# Patient Record
Sex: FEMALE | Race: WHITE
[De-identification: names, ages, dates, MRNs, and addresses within clinical notes are randomized per-mention and may not be internally consistent; named-entity substitution may affect disease eponyms.]

---

## 2021-06-08 ENCOUNTER — HOSPITAL ENCOUNTER (EMERGENCY)
Dept: HOSPITAL 79 - ER1 | Age: 61
Discharge: HOME | End: 2021-06-08
Payer: COMMERCIAL

## 2021-06-08 DIAGNOSIS — E78.5: ICD-10-CM

## 2021-06-08 DIAGNOSIS — E11.9: ICD-10-CM

## 2021-06-08 DIAGNOSIS — Z95.2: ICD-10-CM

## 2021-06-08 DIAGNOSIS — I11.9: ICD-10-CM

## 2021-06-08 DIAGNOSIS — R07.89: Primary | ICD-10-CM

## 2021-06-08 DIAGNOSIS — Z87.891: ICD-10-CM

## 2021-06-08 DIAGNOSIS — Z88.8: ICD-10-CM

## 2021-06-08 DIAGNOSIS — R42: ICD-10-CM

## 2021-06-08 DIAGNOSIS — Z90.49: ICD-10-CM

## 2021-06-08 DIAGNOSIS — R06.02: ICD-10-CM

## 2021-06-08 LAB
BUN/CREATININE RATIO: 19 (ref 0–10)
HGB BLD-MCNC: 12.4 GM/DL (ref 12.3–15.3)
RED BLOOD COUNT: 4.45 M/UL (ref 4–5.1)
WHITE BLOOD COUNT: 7 K/UL (ref 4.5–11)

## 2021-07-18 ENCOUNTER — HOSPITAL ENCOUNTER (EMERGENCY)
Dept: HOSPITAL 79 - ER1 | Age: 61
Discharge: HOME | End: 2021-07-18
Payer: COMMERCIAL

## 2021-07-18 DIAGNOSIS — Z90.49: ICD-10-CM

## 2021-07-18 DIAGNOSIS — Z95.5: ICD-10-CM

## 2021-07-18 DIAGNOSIS — R22.42: Primary | ICD-10-CM

## 2021-07-18 DIAGNOSIS — E78.5: ICD-10-CM

## 2021-07-18 DIAGNOSIS — I11.9: ICD-10-CM

## 2021-07-18 DIAGNOSIS — Z79.4: ICD-10-CM

## 2021-07-18 DIAGNOSIS — J44.9: ICD-10-CM

## 2021-07-18 DIAGNOSIS — E11.9: ICD-10-CM

## 2021-07-18 DIAGNOSIS — Z88.8: ICD-10-CM

## 2021-07-18 LAB
BUN/CREATININE RATIO: 18 (ref 0–10)
HGB BLD-MCNC: 12.1 GM/DL (ref 12.3–15.3)
RED BLOOD COUNT: 4.3 M/UL (ref 4–5.1)
WHITE BLOOD COUNT: 6.3 K/UL (ref 4.5–11)

## 2021-08-06 ENCOUNTER — OFFICE VISIT (OUTPATIENT)
Dept: ENDOCRINOLOGY | Facility: CLINIC | Age: 61
End: 2021-08-06

## 2021-08-06 VITALS
HEIGHT: 63 IN | SYSTOLIC BLOOD PRESSURE: 136 MMHG | HEART RATE: 77 BPM | OXYGEN SATURATION: 99 % | BODY MASS INDEX: 41.46 KG/M2 | DIASTOLIC BLOOD PRESSURE: 58 MMHG | WEIGHT: 234 LBS

## 2021-08-06 DIAGNOSIS — E11.65 UNCONTROLLED TYPE 2 DIABETES MELLITUS WITH HYPERGLYCEMIA (HCC): Primary | ICD-10-CM

## 2021-08-06 LAB
EXPIRATION DATE: ABNORMAL
EXPIRATION DATE: NORMAL
GLUCOSE BLDC GLUCOMTR-MCNC: 204 MG/DL (ref 70–130)
HBA1C MFR BLD: 8.9 %
Lab: ABNORMAL
Lab: NORMAL

## 2021-08-06 PROCEDURE — 82947 ASSAY GLUCOSE BLOOD QUANT: CPT | Performed by: INTERNAL MEDICINE

## 2021-08-06 PROCEDURE — 99204 OFFICE O/P NEW MOD 45 MIN: CPT | Performed by: INTERNAL MEDICINE

## 2021-08-06 PROCEDURE — 83036 HEMOGLOBIN GLYCOSYLATED A1C: CPT | Performed by: INTERNAL MEDICINE

## 2021-08-06 RX ORDER — LISINOPRIL 5 MG/1
5 TABLET ORAL DAILY
COMMUNITY
Start: 2021-07-28 | End: 2022-08-04

## 2021-08-06 RX ORDER — PANTOPRAZOLE SODIUM 40 MG/1
40 TABLET, DELAYED RELEASE ORAL DAILY
COMMUNITY
Start: 2021-07-28

## 2021-08-06 RX ORDER — SITAGLIPTIN 100 MG/1
100 TABLET, FILM COATED ORAL DAILY
COMMUNITY
Start: 2021-07-28 | End: 2021-08-06

## 2021-08-06 RX ORDER — INSULIN LISPRO 100 [IU]/ML
20 INJECTION, SOLUTION INTRAVENOUS; SUBCUTANEOUS 3 TIMES DAILY
COMMUNITY
Start: 2021-07-16 | End: 2022-11-14 | Stop reason: SDUPTHER

## 2021-08-06 RX ORDER — DULAGLUTIDE 0.75 MG/.5ML
0.5 INJECTION, SOLUTION SUBCUTANEOUS WEEKLY
Qty: 6 ML | Refills: 3 | Status: SHIPPED | OUTPATIENT
Start: 2021-08-06 | End: 2022-04-13 | Stop reason: DRUGHIGH

## 2021-08-06 RX ORDER — OMEGA-3/DHA/EPA/FISH OIL 300-1000MG
CAPSULE,DELAYED RELEASE (ENTERIC COATED) ORAL 2 TIMES DAILY
COMMUNITY
Start: 2021-05-28

## 2021-08-06 RX ORDER — FLASH GLUCOSE SCANNING READER
EACH MISCELLANEOUS
COMMUNITY
End: 2022-12-14

## 2021-08-06 RX ORDER — RANOLAZINE 1000 MG/1
1 TABLET, EXTENDED RELEASE ORAL EVERY 12 HOURS
COMMUNITY
Start: 2021-07-25 | End: 2022-08-04

## 2021-08-06 RX ORDER — METOPROLOL TARTRATE 50 MG/1
25 TABLET, FILM COATED ORAL 2 TIMES DAILY
COMMUNITY
Start: 2021-07-28

## 2021-08-06 RX ORDER — ISOSORBIDE MONONITRATE 60 MG/1
60 TABLET, EXTENDED RELEASE ORAL DAILY
COMMUNITY
Start: 2021-07-28 | End: 2022-08-04

## 2021-08-06 RX ORDER — FLASH GLUCOSE SENSOR
KIT MISCELLANEOUS
COMMUNITY
End: 2022-11-14 | Stop reason: SDUPTHER

## 2021-08-06 RX ORDER — ROSUVASTATIN CALCIUM 20 MG/1
20 TABLET, COATED ORAL EVERY EVENING
COMMUNITY
Start: 2021-07-28

## 2021-08-06 RX ORDER — MAGNESIUM 200 MG
1 TABLET ORAL DAILY
COMMUNITY
Start: 2021-07-28

## 2021-08-06 RX ORDER — PRASUGREL 10 MG/1
10 TABLET, FILM COATED ORAL DAILY
COMMUNITY
Start: 2021-07-17

## 2021-08-06 RX ORDER — FLASH GLUCOSE SENSOR
KIT MISCELLANEOUS
COMMUNITY
Start: 2021-07-22 | End: 2021-08-06 | Stop reason: SDUPTHER

## 2021-08-06 RX ORDER — OXYBUTYNIN CHLORIDE 5 MG/1
5 TABLET ORAL 2 TIMES DAILY
COMMUNITY
Start: 2021-07-28 | End: 2022-08-04

## 2021-08-06 RX ORDER — MONTELUKAST SODIUM 10 MG/1
10 TABLET ORAL DAILY
COMMUNITY
Start: 2021-07-28

## 2021-08-06 RX ORDER — INSULIN GLARGINE 100 [IU]/ML
60 INJECTION, SOLUTION SUBCUTANEOUS 2 TIMES DAILY
COMMUNITY
Start: 2021-06-29 | End: 2022-07-29

## 2021-08-06 RX ORDER — PEN NEEDLE, DIABETIC 31 GX5/16"
NEEDLE, DISPOSABLE MISCELLANEOUS SEE ADMIN INSTRUCTIONS
COMMUNITY
Start: 2021-07-15

## 2021-08-06 RX ORDER — ROPINIROLE 0.25 MG/1
0.25 TABLET, FILM COATED ORAL DAILY
COMMUNITY
Start: 2021-07-28

## 2021-08-06 NOTE — PROGRESS NOTES
"     Office Note      Date: 2021  Patient Name: Mariola Beck  MRN: [id number]  : 1960    Chief Complaint   Patient presents with   • Diabetes       History of Present Illness:   Mariola Beck is a 61 y.o. female who presents for Diabetes type 2. Diagnosed in: - presented as gestational. Fixed type 2 in . . Treated in past with insulin. Current patrick tments: added oral agents to insulin never been on glp1.. Number of insulin shots per day: >4. Checks blood sugar >4 times a day. Has low blood sugar: rare.     Last A1c:  Hemoglobin A1C   Date Value Ref Range Status   2021 8.9 % Final           Subjective      Diabetic Complications:  Eyes: Yes, describe: retinopathy   Kidneys: No  Feet: Yes, describe: neuropathy  Heart: Yes, describe: cad and stents     Diet and Exercise:  Meals per day: 4  Minutes of exercise per week: 90 mins.    Review of Systems:   Review of Systems   Constitutional: Positive for activity change.   HENT: Positive for sinus pressure, tinnitus and trouble swallowing.    Eyes: Positive for photophobia, itching and visual disturbance.   Respiratory: Positive for apnea, cough, chest tightness and shortness of breath.    Cardiovascular: Positive for leg swelling.   Endocrine: Positive for polydipsia.   Genitourinary: Positive for enuresis.   Musculoskeletal: Positive for back pain and gait problem.   Neurological: Positive for dizziness, light-headedness, numbness and headaches.   Psychiatric/Behavioral: Positive for confusion. The patient is nervous/anxious.        The following portions of the patient's history were reviewed and updated as appropriate: allergies, current medications, past family history, past medical history, past social history, past surgical history and problem list.    Objective     Visit Vitals  /58   Pulse 77   Ht 160 cm (63\")   Wt 106 kg (234 lb)   SpO2 99%   BMI 41.45 kg/m²       Labs:    CMP  No results found for: GLUCOSE, BUN, CREATININE, " EGFRIFNONA, EGFRIFAFRI, BCR, K, CO2, CALCIUM, PROTENTOTREF, LABIL2, BILIRUBIN, AST, ALT     CBC w/DIFF  No results found for: WBC, RBC, HGB, HCT, MCV, MCH, MCHC, RDW, RDWSD, MPV, PLT, NEUTRORELPCT, LYMPHORELPCT, MONORELPCT, EOSRELPCT, BASORELPCT, AUTOIGPER, NEUTROABS, LYMPHSABS, MONOSABS, EOSABS, BASOSABS, AUTOIGNUM, NRBC    Physical Exam:  Physical Exam  Vitals reviewed.   Constitutional:       Appearance: Normal appearance. She is normal weight.   HENT:      Head: Normocephalic and atraumatic.   Eyes:      Extraocular Movements: Extraocular movements intact.   Neck:      Comments: Irregular right thyroid lobe noted  Cardiovascular:      Rate and Rhythm: Normal rate and regular rhythm.      Pulses:           Dorsalis pedis pulses are 2+ on the right side and 2+ on the left side.        Posterior tibial pulses are 2+ on the right side and 2+ on the left side.   Pulmonary:      Effort: Pulmonary effort is normal. No respiratory distress.   Musculoskeletal:      Right foot: Normal range of motion. No deformity, bunion, Charcot foot, foot drop or prominent metatarsal heads.      Left foot: Normal range of motion. No deformity, bunion, Charcot foot, foot drop or prominent metatarsal heads.   Feet:      Right foot:      Protective Sensation: 5 sites tested. 5 sites sensed.      Skin integrity: Skin integrity normal.      Toenail Condition: Right toenails are normal.      Left foot:      Protective Sensation: 5 sites tested. 5 sites sensed.      Skin integrity: Skin integrity normal.      Toenail Condition: Left toenails are normal.      Comments: Diabetic Foot Exam Performed and Monofilament Test Performed    Lymphadenopathy:      Cervical: No cervical adenopathy.   Skin:     General: Skin is warm and dry.   Neurological:      Mental Status: She is alert.   Psychiatric:         Mood and Affect: Mood normal.         Thought Content: Thought content normal.         Judgment: Judgment normal.          Assessment / Plan       Assessment & Plan:  Problem List Items Addressed This Visit        Other    Uncontrolled type 2 diabetes mellitus with hyperglycemia (CMS/[facility]) - Primary    Overview     Cannot take sglt2 due to frequent uti         Current Assessment & Plan      a1c high . At high risk for new complications    -1- diet and exercise guidelines reviewed  -2- compliance with current meds verified as much as possible.  -3- stop januvia  -4- start glp-1 side effects were discussed. No personal or family with mtc known.          Relevant Medications    Insulin Glargine (BASAGLAR KWIKPEN) 100 UNIT/ML injection pen    HumaLOG KwikPen 100 UNIT/ML solution pen-injector    metFORMIN (GLUCOPHAGE) 1000 MG tablet    Dulaglutide (Trulicity) 0.75 MG/0.5ML solution pen-injector    Other Relevant Orders    POC Glycosylated Hemoglobin (Hb A1C) (Completed)    POC Glucose, Blood (Completed)           Murtaza Perez MD   08/06/2021

## 2021-08-06 NOTE — ASSESSMENT & PLAN NOTE
a1c high . At high risk for new complications    -1- diet and exercise guidelines reviewed  -2- compliance with current meds verified as much as possible.  -3- stop januvia  -4- start glp-1 side effects were discussed. No personal or family with mtc known.

## 2021-08-09 ENCOUNTER — PATIENT ROUNDING (BHMG ONLY) (OUTPATIENT)
Dept: ENDOCRINOLOGY | Facility: CLINIC | Age: 61
End: 2021-08-09

## 2021-08-09 NOTE — PROGRESS NOTES
August 9, 2021    Hello, may I speak with Mariola Beck?    My name is Jocelyne    I am  with [facility]  [address] [address] [phone].    Before we get started may I verify your date of birth? 1960    I am calling to officially welcome you to our practice and ask about your recent visit. Is this a good time to talk? YES      Tell me about your visit with us. What things went well?  THINGS WENT VERY WELL, STILL WAITING FOR A F/U APPT. WITH THE NP IN [place]. ADVISED PT. WE ARE CURRENTLY WORKING ON HER SCHEDULE AND SOMEONE WOULD BE IN TOUCH SOON.       We're always looking for ways to make our patients' experiences even better. Do you have recommendations on ways we may improve?  NO EVERYTHING WAS GOOD    Overall were you satisfied with your first visit to our practice? YES        I appreciate you taking the time to speak with me today. Is there anything else I can do for you? NO      Thank you, and have a great day.

## 2021-12-06 ENCOUNTER — SPECIALTY PHARMACY (OUTPATIENT)
Dept: PHARMACY | Facility: HOSPITAL | Age: 61
End: 2021-12-06

## 2021-12-06 ENCOUNTER — OFFICE VISIT (OUTPATIENT)
Dept: ENDOCRINOLOGY | Facility: CLINIC | Age: 61
End: 2021-12-06

## 2021-12-06 VITALS
WEIGHT: 239 LBS | HEIGHT: 63 IN | TEMPERATURE: 97.8 F | DIASTOLIC BLOOD PRESSURE: 80 MMHG | HEART RATE: 87 BPM | BODY MASS INDEX: 42.35 KG/M2 | SYSTOLIC BLOOD PRESSURE: 126 MMHG | OXYGEN SATURATION: 95 %

## 2021-12-06 DIAGNOSIS — E11.65 UNCONTROLLED TYPE 2 DIABETES MELLITUS WITH HYPERGLYCEMIA (HCC): Primary | ICD-10-CM

## 2021-12-06 LAB
EXPIRATION DATE: ABNORMAL
HBA1C MFR BLD: 7 %
Lab: ABNORMAL

## 2021-12-06 PROCEDURE — 99213 OFFICE O/P EST LOW 20 MIN: CPT | Performed by: NURSE PRACTITIONER

## 2021-12-06 PROCEDURE — 83036 HEMOGLOBIN GLYCOSYLATED A1C: CPT | Performed by: NURSE PRACTITIONER

## 2021-12-06 RX ORDER — ASPIRIN 81 MG/1
81 TABLET ORAL DAILY
COMMUNITY

## 2021-12-06 NOTE — PROGRESS NOTES
Specialty Pharmacy Patient Management Program  Endocrinology Initial Assessment       Mariola Beck is a 61 y.o. female with Type 2 Diabetes seen by an Endocrinology provider and enrolled in the Endocrinology Patient Management program offered by [facility].     Patient is currently using Basaglar, Humalog, metformin, and Trulicity for her DM regimen. Patient reports some nausea with Trulicity, but tolerating well overall. She reports that since being on this regimen, she has seen improvement in her A1C (7.0% today), and her BG.     Relevant Past Medical History and Comorbidities  Relevant medical history and concomitant health conditions were discussed with the patient. The patient's chart has been reviewed for relevant past medical history and comorbid health conditions and updated as necessary.   Past Medical History:   Diagnosis Date   • Asthma    • Chronic headaches    • H/O bladder infections    • Heart attack (HCC)    • Heart disease    • Hypertension    • Type 2 diabetes mellitus (HCC)      Social History     Socioeconomic History   • Marital status:    Tobacco Use   • Smoking status: Former Smoker     Packs/day: 1.00     Types: Cigarettes   • Smokeless tobacco: Never Used   Vaping Use   • Vaping Use: Never used   Substance and Sexual Activity   • Alcohol use: Not Currently   • Drug use: Never   • Sexual activity: Defer       Allergies  Known allergies and reactions were discussed with the patient. The patient's chart has been reviewed for  allergy information and updated as necessary.   Plavix [clopidogrel]    Current Medication List  This medication list has been reviewed with the patient and evaluated for any interactions or necessary modifications/recommendations, and updated to include all prescription medications, OTC medications, and supplements the patient is currently taking.  This list reflects what is contained in the patient's profile, which has also been  marked as reviewed to communicate to other providers it is the most up to date version of the patient's current medication therapy.         Current Outpatient Medications:   •  aspirin 81 MG EC tablet, Take 81 mg by mouth Daily., Disp: , Rfl:   •  B-D ULTRAFINE III SHORT PEN 31G X 8 MM misc, See Admin Instructions., Disp: , Rfl:   •  Continuous Blood Gluc  (FreeStyle Raymond 14 Day [place]) device, , Disp: , Rfl:   •  Continuous Blood Gluc Sensor (FreeStyle Raymond 14 Day Sensor) misc, Change every 14 days, Disp: , Rfl:   •  Cyanocobalamin (B-12) 1000 MCG sublingual tablet, Take 1 tablet by mouth Daily., Disp: , Rfl:   •  Dulaglutide (Trulicity) 0.75 MG/0.5ML solution pen-injector, Inject 0.75 mg under the skin into the appropriate area as directed 1 (One) Time Per Week., Disp: 6 mL, Rfl: 3  •  HumaLOG KwikPen 100 UNIT/ML solution pen-injector, Inject 20 Units under the skin into the appropriate area as directed 3 (Three) Times a Day., Disp: , Rfl:   •  Insulin Glargine (BASAGLAR KWIKPEN) 100 UNIT/ML injection pen, Inject 60 Units under the skin into the appropriate area as directed 2 (two) times a day., Disp: , Rfl:   •  isosorbide mononitrate (IMDUR) 60 MG 24 hr tablet, Take 60 mg by mouth Daily., Disp: , Rfl:   •  lisinopril (PRINIVIL,ZESTRIL) 5 MG tablet, Take 5 mg by mouth Daily., Disp: , Rfl:   •  metFORMIN (GLUCOPHAGE) 1000 MG tablet, Take 1,000 mg by mouth 2 (Two) Times a Day., Disp: , Rfl:   •  metoprolol tartrate (LOPRESSOR) 50 MG tablet, Take 50 mg by mouth 2 (Two) Times a Day., Disp: , Rfl:   •  montelukast (SINGULAIR) 10 MG tablet, Take 10 mg by mouth Daily., Disp: , Rfl:   •  Omega-3 Fatty Acids (Odorless Coated Fish Oil) 1000 MG capsule delayed-release, 2 (two) times a day., Disp: , Rfl:   •  oxybutynin (DITROPAN) 5 MG tablet, Take 5 mg by mouth 2 (Two) Times a Day., Disp: , Rfl:   •  pantoprazole (PROTONIX) 40 MG EC tablet, Take 40 mg by mouth Daily., Disp: , Rfl:   •  prasugrel (EFFIENT) 10 MG  tablet, Take 10 mg by mouth Daily., Disp: , Rfl:   •  ranolazine (RANEXA) 1000 MG 12 hr tablet, Take 1 tablet by mouth Every 12 (Twelve) Hours., Disp: , Rfl:   •  rOPINIRole (REQUIP) 0.25 MG tablet, Take 0.25 mg by mouth Daily., Disp: , Rfl:   •  rosuvastatin (CRESTOR) 20 MG tablet, Take 20 mg by mouth Every Evening., Disp: , Rfl:     Drug Interactions  None    Recommended Medications Assessment  • Aspirin - Currently Taking  • Statin - Currently Taking  • ACEi/ARB - Currently Taking    Current 10-Year ASCVD Risk:     Relevant Laboratory Values  A1C Last 3 Results    HGBA1C Last 3 Results 8/6/21 12/6/21   Hemoglobin A1C 8.9 7.0           Lab Results   Component Value Date    HGBA1C 7.0 12/06/2021     No results found for: GLUCOSE, CALCIUM, NA, K, CO2, CL, BUN, CREATININE, EGFRIFAFRI, EGFRIFNONA, BCR, ANIONGAP  No results found for: CHOL, CHLPL, TRIG, HDL, LDL, LDLDIRECT      Education Provided for DM medications:  The patient has been provided with the following education and any applicable administration techniques (i.e. self-injection) have been demonstrated for the therapies indicated. All questions and concerns have been addressed prior to the patient receiving the medication, and the patient has verbalized understanding of the education and any materials provided.  Additional patient education shall be provided and documented upon request by the patient, provider or payer.      Trulicity  · Discussed the medication's MOA and that it helps you feel full, helps your body release more insulin and helps your body make less sugar after meals.  · Also discussed that nausea, vomiting, constipation and/or diarrhea tend to be the most common adverse effects, especially if larger meals are eaten.  Encouraged smaller meals throughout the day.      Adherence and Self-Administration  • Barriers to Patient Adherence and/or Self-Administration: None   • Methods for Supporting Patient Adherence and/or Self-Administration:  None    Vaccination Status:   COVID 19: UTD  Influenza: UTD  Pneumococcal: UTD   Hep B: unknown      Goals of Therapy    Keep A1C at goal.       Reassessment Plan & Follow-Up  1. No pharmacologic recommendations at this time. Patient is at goal. Could consider increasing Trulicity dose in the future.     Patient does not wish to use [facility] services at this time.

## 2021-12-06 NOTE — ASSESSMENT & PLAN NOTE
-Diabetes is at goal with A1C 7.0.  -Continue Trulicity 0.75 mg weekly.  -Continue Basaglar 60 units BID.  -Continue Humalog 20 units TID with meals.  -Continue Metformin 1,000 mg BID.  -S/S hypoglycemia discussed with Rule of 15's advised.  -Continue checking BG regularly.  -Will reassess in 4 months.

## 2021-12-06 NOTE — PROGRESS NOTES
"Chief Complaint   Patient presents with   • Diabetes     follow up           HPI   Mariola Beck is a 61 y.o. female had concerns including Diabetes (follow up ).    T2DM.    She is checking blood sugars frequently with Freestyle Raymond.  Current medications for diabetes include Trulicity 0.75 mg weekly, Basaglar 60 units BID, Humalog 20 units TID, Metformin 1,000 mg BID.  She is on statin therapy.  Hypos: rarely  She has had some vomiting/nausea but it has not increased since starting Trulicity.    The following portions of the patient's history were reviewed and updated as appropriate: allergies, current medications, past family history, past medical history, past social history, past surgical history and problem list.      Review of Systems   Constitutional: Positive for fatigue.   Endocrine: Positive for polydipsia and polyuria.   All other systems reviewed and are negative.       Physical Exam  Vitals reviewed.   Constitutional:       Appearance: Normal appearance.   Pulmonary:      Effort: Pulmonary effort is normal.   Neurological:      Mental Status: She is alert and oriented to person, place, and time.   Psychiatric:         Mood and Affect: Mood normal.         Behavior: Behavior normal.         Thought Content: Thought content normal.         Judgment: Judgment normal.        /80 (BP Location: Left arm, Patient Position: Sitting, Cuff Size: Adult)   Pulse 87   Temp 97.8 °F (36.6 °C) (Oral)   Ht 160 cm (63\")   Wt 108 kg (239 lb)   SpO2 95%   Breastfeeding No   BMI 42.34 kg/m²      Labs and imaging    CMP:     Lipid Panel:  No results found for: CHOL, TRIG, HDL, VLDL, LDL  HbA1c:  Lab Results   Component Value Date    HGBA1C 7.0 12/06/2021    HGBA1C 8.9 08/06/2021     Glucose:    Lab Results   Component Value Date    POCGLU 204 (A) 08/06/2021     Microalbumin:  No results found for: MALBCRERATIO  TSH:  No results found for: TSH    Assessment and plan  Diagnoses and all orders for this " visit:    1. Uncontrolled type 2 diabetes mellitus with hyperglycemia (HCC) (Primary)  Assessment & Plan:  -Diabetes is at goal with A1C 7.0.  -Continue Trulicity 0.75 mg weekly.  -Continue Basaglar 60 units BID.  -Continue Humalog 20 units TID with meals.  -Continue Metformin 1,000 mg BID.  -S/S hypoglycemia discussed with Rule of 15's advised.  -Continue checking BG regularly.  -Will reassess in 4 months.      Orders:  -     POC Glycosylated Hemoglobin (Hb A1C)       Return in about 4 months (around 4/6/2022) for Follow-up appointment, A1C. The patient was instructed to contact the clinic with any interval questions or concerns.    KHURRAM Maldonado

## 2022-01-22 ENCOUNTER — HOSPITAL ENCOUNTER (EMERGENCY)
Dept: HOSPITAL 79 - ER1 | Age: 62
Discharge: HOME | End: 2022-01-22
Payer: COMMERCIAL

## 2022-01-22 DIAGNOSIS — Z20.822: ICD-10-CM

## 2022-01-22 DIAGNOSIS — E78.5: ICD-10-CM

## 2022-01-22 DIAGNOSIS — Z88.8: ICD-10-CM

## 2022-01-22 DIAGNOSIS — I11.9: ICD-10-CM

## 2022-01-22 DIAGNOSIS — J06.9: Primary | ICD-10-CM

## 2022-01-22 DIAGNOSIS — E11.9: ICD-10-CM

## 2022-01-22 LAB
BUN/CREATININE RATIO: 20 (ref 0–10)
HGB BLD-MCNC: 12.9 GM/DL (ref 12.3–15.3)
RED BLOOD COUNT: 4.63 M/UL (ref 4–5.1)
WHITE BLOOD COUNT: 10.2 K/UL (ref 4.5–11)

## 2022-01-22 PROCEDURE — U0002 COVID-19 LAB TEST NON-CDC: HCPCS

## 2022-02-13 ENCOUNTER — HOSPITAL ENCOUNTER (EMERGENCY)
Dept: HOSPITAL 79 - ER1 | Age: 62
Discharge: HOME | End: 2022-02-13
Payer: COMMERCIAL

## 2022-02-13 DIAGNOSIS — I25.2: ICD-10-CM

## 2022-02-13 DIAGNOSIS — Z20.822: ICD-10-CM

## 2022-02-13 DIAGNOSIS — F17.210: ICD-10-CM

## 2022-02-13 DIAGNOSIS — N39.0: ICD-10-CM

## 2022-02-13 DIAGNOSIS — G43.909: Primary | ICD-10-CM

## 2022-02-13 DIAGNOSIS — Z88.8: ICD-10-CM

## 2022-02-13 DIAGNOSIS — R11.2: ICD-10-CM

## 2022-02-13 LAB
BUN/CREATININE RATIO: 15 (ref 0–10)
HGB BLD-MCNC: 13.6 GM/DL (ref 12.3–15.3)
RED BLOOD COUNT: 4.77 M/UL (ref 4–5.1)
WHITE BLOOD COUNT: 12.7 K/UL (ref 4.5–11)

## 2022-04-13 ENCOUNTER — OFFICE VISIT (OUTPATIENT)
Dept: ENDOCRINOLOGY | Facility: CLINIC | Age: 62
End: 2022-04-13

## 2022-04-13 VITALS
HEART RATE: 77 BPM | HEIGHT: 63 IN | OXYGEN SATURATION: 96 % | SYSTOLIC BLOOD PRESSURE: 90 MMHG | DIASTOLIC BLOOD PRESSURE: 50 MMHG | BODY MASS INDEX: 40.4 KG/M2 | TEMPERATURE: 96 F | WEIGHT: 228 LBS

## 2022-04-13 DIAGNOSIS — I10 ESSENTIAL HYPERTENSION: ICD-10-CM

## 2022-04-13 DIAGNOSIS — E11.65 UNCONTROLLED TYPE 2 DIABETES MELLITUS WITH HYPERGLYCEMIA: Primary | ICD-10-CM

## 2022-04-13 LAB — GLUCOSE BLDC GLUCOMTR-MCNC: 101 MG/DL (ref 70–130)

## 2022-04-13 PROCEDURE — 82962 GLUCOSE BLOOD TEST: CPT | Performed by: NURSE PRACTITIONER

## 2022-04-13 PROCEDURE — 99213 OFFICE O/P EST LOW 20 MIN: CPT | Performed by: NURSE PRACTITIONER

## 2022-04-13 RX ORDER — DULAGLUTIDE 1.5 MG/.5ML
1.5 INJECTION, SOLUTION SUBCUTANEOUS WEEKLY
Qty: 6 ML | Refills: 1 | Status: SHIPPED | OUTPATIENT
Start: 2022-04-13 | End: 2022-08-04 | Stop reason: DRUGHIGH

## 2022-04-13 NOTE — ASSESSMENT & PLAN NOTE
-Diabetes is above goal with A1C up from 7.0 to 7.7.  -Increase Trulicity 1.5 mg weekly.  -Continue Basaglar 60 units BID.  -Continue Humalog 20 units TID with meals.  -Continue Metformin 1,000 mg BID.  -S/S hypoglycemia discussed with Rule of 15's advised.  -Continue checking BG regularly.  -Will reassess in 3 months.

## 2022-04-13 NOTE — PROGRESS NOTES
"Chief Complaint   Patient presents with   • Diabetes     Follow up           Mariola Beck is a 61 y.o. female had concerns including Diabetes (Follow up).    T2DM.    She is checking blood sugars frequently with Freestyle Raymond.  Current medications for diabetes include Trulicity 0.75 mg weekly, Basaglar 60 units BID, Humalog 20 units TID, Metformin 1,000 mg BID.  She is on statin therapy.  Hypos: rarely  She has had bronchitis and flu since she was here.  She was in the hospital. She had steroid shots while she in the hospital and prior to being in the hospital.    She had an A1C 3-4 weeks ago and it was 7.7.    The following portions of the patient's history were reviewed and updated as appropriate: allergies, current medications, past family history, past medical history, past social history, past surgical history and problem list.      Review of Systems   Constitutional: Positive for fatigue.   Respiratory: Positive for shortness of breath.    Endocrine: Positive for polydipsia. Negative for polyuria.   All other systems reviewed and are negative.       Physical Exam  Vitals reviewed.   Constitutional:       Appearance: Normal appearance.   Pulmonary:      Effort: Pulmonary effort is normal.   Neurological:      Mental Status: She is alert and oriented to person, place, and time.   Psychiatric:         Mood and Affect: Mood normal.         Behavior: Behavior normal.         Thought Content: Thought content normal.         Judgment: Judgment normal.        BP 90/50 (BP Location: Left arm, Patient Position: Sitting, Cuff Size: Adult)   Pulse 77   Temp 96 °F (35.6 °C) (Infrared)   Ht 160 cm (63\")   Wt 103 kg (228 lb)   LMP  (LMP Unknown)   SpO2 96%   Breastfeeding No   BMI 40.39 kg/m²      Labs and imaging    CMP:     Lipid Panel:  No results found for: CHOL, TRIG, HDL, VLDL, LDL  HbA1c:  Lab Results   Component Value Date    HGBA1C 7.0 12/06/2021    HGBA1C 8.9 08/06/2021     Glucose:    Lab Results "   Component Value Date    POCGLU 101 04/13/2022     Microalbumin:  No results found for: MALBCREOLLIEPARISHO  TSH:  No results found for: TSH    Assessment and plan  Diagnoses and all orders for this visit:    1. Uncontrolled type 2 diabetes mellitus with hyperglycemia (HCC) (Primary)  Assessment & Plan:  -Diabetes is above goal with A1C up from 7.0 to 7.7.  -Increase Trulicity 1.5 mg weekly.  -Continue Basaglar 60 units BID.  -Continue Humalog 20 units TID with meals.  -Continue Metformin 1,000 mg BID.  -S/S hypoglycemia discussed with Rule of 15's advised.  -Continue checking BG regularly.  -Will reassess in 3 months.    Orders:  -     POC Glucose Fingerstick    2. Essential hypertension  Assessment & Plan:  Patient was taken off of all her medications while she was in the hospital.  She restarted those once she was discharged.  She has had some lower than optimal values the last few days.  Informed her to omit her evening dose of Metoprolol and to call her prescribing provider (cardiologist) in the am for further instructions on her medication regimen.      Other orders  -     Dulaglutide (Trulicity) 1.5 MG/0.5ML solution pen-injector; Inject 1.5 mg under the skin into the appropriate area as directed 1 (One) Time Per Week.  Dispense: 6 mL; Refill: 1       Return in about 3 months (around 7/13/2022) for Follow-up appointment, A1C. The patient was instructed to contact the clinic with any interval questions or concerns.    KHURRAM Maldonado   Endocrinology

## 2022-04-14 ENCOUNTER — TELEPHONE (OUTPATIENT)
Dept: ENDOCRINOLOGY | Facility: CLINIC | Age: 62
End: 2022-04-14

## 2022-04-14 NOTE — ASSESSMENT & PLAN NOTE
Patient was taken off of all her medications while she was in the hospital.  She restarted those once she was discharged.  She has had some lower than optimal values the last few days.  Informed her to omit her evening dose of Metoprolol and to call her prescribing provider (cardiologist) in the am for further instructions on her medication regimen.

## 2022-04-27 ENCOUNTER — TELEPHONE (OUTPATIENT)
Dept: PHARMACY | Facility: HOSPITAL | Age: 62
End: 2022-04-27

## 2022-05-05 ENCOUNTER — HOSPITAL ENCOUNTER (INPATIENT)
Dept: HOSPITAL 79 - ER1 | Age: 62
LOS: 6 days | Discharge: HOME | DRG: 871 | End: 2022-05-11
Attending: STUDENT IN AN ORGANIZED HEALTH CARE EDUCATION/TRAINING PROGRAM | Admitting: STUDENT IN AN ORGANIZED HEALTH CARE EDUCATION/TRAINING PROGRAM
Payer: COMMERCIAL

## 2022-05-05 VITALS — BODY MASS INDEX: 36.32 KG/M2 | HEIGHT: 65 IN | WEIGHT: 218 LBS

## 2022-05-05 DIAGNOSIS — J69.0: ICD-10-CM

## 2022-05-05 DIAGNOSIS — E66.01: ICD-10-CM

## 2022-05-05 DIAGNOSIS — J96.01: ICD-10-CM

## 2022-05-05 DIAGNOSIS — T42.75XA: ICD-10-CM

## 2022-05-05 DIAGNOSIS — Z98.891: ICD-10-CM

## 2022-05-05 DIAGNOSIS — R13.10: ICD-10-CM

## 2022-05-05 DIAGNOSIS — G25.81: ICD-10-CM

## 2022-05-05 DIAGNOSIS — I25.10: ICD-10-CM

## 2022-05-05 DIAGNOSIS — L89.156: ICD-10-CM

## 2022-05-05 DIAGNOSIS — Z88.8: ICD-10-CM

## 2022-05-05 DIAGNOSIS — R65.21: ICD-10-CM

## 2022-05-05 DIAGNOSIS — E11.65: ICD-10-CM

## 2022-05-05 DIAGNOSIS — A41.9: Primary | ICD-10-CM

## 2022-05-05 DIAGNOSIS — R53.81: ICD-10-CM

## 2022-05-05 DIAGNOSIS — Z20.822: ICD-10-CM

## 2022-05-05 DIAGNOSIS — I08.1: ICD-10-CM

## 2022-05-05 DIAGNOSIS — Z82.49: ICD-10-CM

## 2022-05-05 DIAGNOSIS — E83.42: ICD-10-CM

## 2022-05-05 DIAGNOSIS — Z82.0: ICD-10-CM

## 2022-05-05 DIAGNOSIS — Z79.4: ICD-10-CM

## 2022-05-05 DIAGNOSIS — E78.5: ICD-10-CM

## 2022-05-05 DIAGNOSIS — L89.151: ICD-10-CM

## 2022-05-05 DIAGNOSIS — I95.9: ICD-10-CM

## 2022-05-05 DIAGNOSIS — R00.0: ICD-10-CM

## 2022-05-05 DIAGNOSIS — R57.0: ICD-10-CM

## 2022-05-05 DIAGNOSIS — Z79.01: ICD-10-CM

## 2022-05-05 DIAGNOSIS — Z87.891: ICD-10-CM

## 2022-05-05 DIAGNOSIS — I10: ICD-10-CM

## 2022-05-05 DIAGNOSIS — Z90.49: ICD-10-CM

## 2022-05-05 DIAGNOSIS — Z79.82: ICD-10-CM

## 2022-05-05 DIAGNOSIS — G47.33: ICD-10-CM

## 2022-05-05 DIAGNOSIS — I46.8: ICD-10-CM

## 2022-05-05 DIAGNOSIS — Z95.5: ICD-10-CM

## 2022-05-05 LAB
BUN/CREATININE RATIO: 26 (ref 0–10)
HGB BLD-MCNC: 12.4 GM/DL (ref 12.3–15.3)
RED BLOOD COUNT: 4.38 M/UL (ref 4–5.1)
WHITE BLOOD COUNT: 10.4 K/UL (ref 4.5–11)

## 2022-05-05 PROCEDURE — 3E03329 INTRODUCTION OF OTHER ANTI-INFECTIVE INTO PERIPHERAL VEIN, PERCUTANEOUS APPROACH: ICD-10-PCS | Performed by: STUDENT IN AN ORGANIZED HEALTH CARE EDUCATION/TRAINING PROGRAM

## 2022-05-05 PROCEDURE — 3E043XZ INTRODUCTION OF VASOPRESSOR INTO CENTRAL VEIN, PERCUTANEOUS APPROACH: ICD-10-PCS | Performed by: STUDENT IN AN ORGANIZED HEALTH CARE EDUCATION/TRAINING PROGRAM

## 2022-05-05 PROCEDURE — C9113 INJ PANTOPRAZOLE SODIUM, VIA: HCPCS

## 2022-05-05 PROCEDURE — 5A1945Z RESPIRATORY VENTILATION, 24-96 CONSECUTIVE HOURS: ICD-10-PCS | Performed by: STUDENT IN AN ORGANIZED HEALTH CARE EDUCATION/TRAINING PROGRAM

## 2022-05-05 PROCEDURE — 0BH17EZ INSERTION OF ENDOTRACHEAL AIRWAY INTO TRACHEA, VIA NATURAL OR ARTIFICIAL OPENING: ICD-10-PCS | Performed by: STUDENT IN AN ORGANIZED HEALTH CARE EDUCATION/TRAINING PROGRAM

## 2022-05-05 PROCEDURE — U0002 COVID-19 LAB TEST NON-CDC: HCPCS

## 2022-05-05 PROCEDURE — 5A12012 PERFORMANCE OF CARDIAC OUTPUT, SINGLE, MANUAL: ICD-10-PCS | Performed by: STUDENT IN AN ORGANIZED HEALTH CARE EDUCATION/TRAINING PROGRAM

## 2022-05-06 LAB
BUN/CREATININE RATIO: 22 (ref 0–10)
HGB BLD-MCNC: 12.7 GM/DL (ref 12.3–15.3)
RED BLOOD COUNT: 4.5 M/UL (ref 4–5.1)
WHITE BLOOD COUNT: 13 K/UL (ref 4.5–11)

## 2022-05-06 PROCEDURE — B24BZZZ ULTRASONOGRAPHY OF HEART WITH AORTA: ICD-10-PCS | Performed by: STUDENT IN AN ORGANIZED HEALTH CARE EDUCATION/TRAINING PROGRAM

## 2022-05-07 LAB
BUN/CREATININE RATIO: 16 (ref 0–10)
HGB BLD-MCNC: 11.4 GM/DL (ref 12.3–15.3)
RED BLOOD COUNT: 4.09 M/UL (ref 4–5.1)
WHITE BLOOD COUNT: 8.7 K/UL (ref 4.5–11)

## 2022-05-07 PROCEDURE — 0DH67UZ INSERTION OF FEEDING DEVICE INTO STOMACH, VIA NATURAL OR ARTIFICIAL OPENING: ICD-10-PCS | Performed by: STUDENT IN AN ORGANIZED HEALTH CARE EDUCATION/TRAINING PROGRAM

## 2022-05-08 LAB
BUN/CREATININE RATIO: 19 (ref 0–10)
HGB BLD-MCNC: 11.8 GM/DL (ref 12.3–15.3)
RED BLOOD COUNT: 4.05 M/UL (ref 4–5.1)
WHITE BLOOD COUNT: 9.3 K/UL (ref 4.5–11)

## 2022-05-08 PROCEDURE — 5A0945A ASSISTANCE WITH RESPIRATORY VENTILATION, 24-96 CONSECUTIVE HOURS, HIGH NASAL FLOW/VELOCITY: ICD-10-PCS | Performed by: STUDENT IN AN ORGANIZED HEALTH CARE EDUCATION/TRAINING PROGRAM

## 2022-05-09 LAB
BUN/CREATININE RATIO: 26 (ref 0–10)
HGB BLD-MCNC: 12 GM/DL (ref 12.3–15.3)
ORGANISM ID: (no result)
RED BLOOD COUNT: 4.33 M/UL (ref 4–5.1)
WHITE BLOOD COUNT: 13.4 K/UL (ref 4.5–11)

## 2022-05-10 LAB
BUN/CREATININE RATIO: 31 (ref 0–10)
HGB BLD-MCNC: 11.7 GM/DL (ref 12.3–15.3)
RED BLOOD COUNT: 4.16 M/UL (ref 4–5.1)
WHITE BLOOD COUNT: 12 K/UL (ref 4.5–11)

## 2022-05-11 LAB
BUN/CREATININE RATIO: 33 (ref 0–10)
HGB BLD-MCNC: 11.8 GM/DL (ref 12.3–15.3)
RED BLOOD COUNT: 4.16 M/UL (ref 4–5.1)
WHITE BLOOD COUNT: 9.8 K/UL (ref 4.5–11)

## 2022-05-26 ENCOUNTER — HOSPITAL ENCOUNTER (OUTPATIENT)
Dept: HOSPITAL 79 - HEART 5 | Age: 62
End: 2022-05-26
Attending: NURSE PRACTITIONER
Payer: COMMERCIAL

## 2022-05-26 DIAGNOSIS — I20.9: Primary | ICD-10-CM

## 2022-05-26 PROCEDURE — A9502 TC99M TETROFOSMIN: HCPCS

## 2022-06-02 ENCOUNTER — HOSPITAL ENCOUNTER (OUTPATIENT)
Dept: HOSPITAL 79 - ER1 | Age: 62
Setting detail: OBSERVATION
LOS: 2 days | Discharge: HOME | End: 2022-06-04
Attending: STUDENT IN AN ORGANIZED HEALTH CARE EDUCATION/TRAINING PROGRAM | Admitting: STUDENT IN AN ORGANIZED HEALTH CARE EDUCATION/TRAINING PROGRAM
Payer: COMMERCIAL

## 2022-06-02 VITALS — BODY MASS INDEX: 39.87 KG/M2 | WEIGHT: 225 LBS | HEIGHT: 63 IN

## 2022-06-02 DIAGNOSIS — I65.22: ICD-10-CM

## 2022-06-02 DIAGNOSIS — Z79.84: ICD-10-CM

## 2022-06-02 DIAGNOSIS — Z95.1: ICD-10-CM

## 2022-06-02 DIAGNOSIS — I25.2: ICD-10-CM

## 2022-06-02 DIAGNOSIS — Z87.891: ICD-10-CM

## 2022-06-02 DIAGNOSIS — J44.9: ICD-10-CM

## 2022-06-02 DIAGNOSIS — Z86.74: ICD-10-CM

## 2022-06-02 DIAGNOSIS — Z79.4: ICD-10-CM

## 2022-06-02 DIAGNOSIS — G47.33: ICD-10-CM

## 2022-06-02 DIAGNOSIS — D64.9: ICD-10-CM

## 2022-06-02 DIAGNOSIS — E78.5: ICD-10-CM

## 2022-06-02 DIAGNOSIS — I10: ICD-10-CM

## 2022-06-02 DIAGNOSIS — I25.10: ICD-10-CM

## 2022-06-02 DIAGNOSIS — E66.9: ICD-10-CM

## 2022-06-02 DIAGNOSIS — Z87.09: ICD-10-CM

## 2022-06-02 DIAGNOSIS — Z95.5: ICD-10-CM

## 2022-06-02 DIAGNOSIS — G25.81: ICD-10-CM

## 2022-06-02 DIAGNOSIS — Z88.8: ICD-10-CM

## 2022-06-02 DIAGNOSIS — Z79.899: ICD-10-CM

## 2022-06-02 DIAGNOSIS — H10.9: ICD-10-CM

## 2022-06-02 DIAGNOSIS — E11.9: ICD-10-CM

## 2022-06-02 DIAGNOSIS — Z90.49: ICD-10-CM

## 2022-06-02 DIAGNOSIS — R07.89: Primary | ICD-10-CM

## 2022-06-02 DIAGNOSIS — Z79.82: ICD-10-CM

## 2022-06-02 LAB
BUN/CREATININE RATIO: 30 (ref 0–10)
HGB BLD-MCNC: 12.2 GM/DL (ref 12.3–15.3)
RED BLOOD COUNT: 4.37 M/UL (ref 4–5.1)
WHITE BLOOD COUNT: 7.1 K/UL (ref 4.5–11)

## 2022-06-02 PROCEDURE — C1887 CATHETER, GUIDING: HCPCS

## 2022-06-02 PROCEDURE — C9600 PERC DRUG-EL COR STENT SING: HCPCS

## 2022-06-02 PROCEDURE — G0378 HOSPITAL OBSERVATION PER HR: HCPCS

## 2022-06-02 PROCEDURE — C1874 STENT, COATED/COV W/DEL SYS: HCPCS

## 2022-06-02 PROCEDURE — C1769 GUIDE WIRE: HCPCS

## 2022-06-02 PROCEDURE — C1725 CATH, TRANSLUMIN NON-LASER: HCPCS

## 2022-06-03 LAB
BUN/CREATININE RATIO: 22 (ref 0–10)
HGB BLD-MCNC: 11.7 GM/DL (ref 12.3–15.3)
RED BLOOD COUNT: 4.18 M/UL (ref 4–5.1)
WHITE BLOOD COUNT: 5.5 K/UL (ref 4.5–11)

## 2022-06-03 PROCEDURE — 4A023N7 MEASUREMENT OF CARDIAC SAMPLING AND PRESSURE, LEFT HEART, PERCUTANEOUS APPROACH: ICD-10-PCS | Performed by: INTERNAL MEDICINE

## 2022-06-03 PROCEDURE — B2111ZZ FLUOROSCOPY OF MULTIPLE CORONARY ARTERIES USING LOW OSMOLAR CONTRAST: ICD-10-PCS | Performed by: INTERNAL MEDICINE

## 2022-06-03 PROCEDURE — 027034Z DILATION OF CORONARY ARTERY, ONE ARTERY WITH DRUG-ELUTING INTRALUMINAL DEVICE, PERCUTANEOUS APPROACH: ICD-10-PCS | Performed by: INTERNAL MEDICINE

## 2022-06-04 LAB
BUN/CREATININE RATIO: 15 (ref 0–10)
HGB BLD-MCNC: 11.4 GM/DL (ref 12.3–15.3)
RED BLOOD COUNT: 4.17 M/UL (ref 4–5.1)
WHITE BLOOD COUNT: 5.1 K/UL (ref 4.5–11)

## 2022-07-29 ENCOUNTER — TELEPHONE (OUTPATIENT)
Dept: ENDOCRINOLOGY | Facility: CLINIC | Age: 62
End: 2022-07-29

## 2022-07-29 RX ORDER — INSULIN DETEMIR 100 [IU]/ML
60 INJECTION, SOLUTION SUBCUTANEOUS DAILY
Qty: 45 ML | Refills: 2 | Status: SHIPPED | OUTPATIENT
Start: 2022-07-29 | End: 2022-11-14 | Stop reason: SDUPTHER

## 2022-07-29 NOTE — TELEPHONE ENCOUNTER
Pt called and sts that the insurance is not wanting to cover the basaglar. She would like for a prescription of levimer sent because they will cover that one per the representative they spoke with it.

## 2022-08-04 ENCOUNTER — SPECIALTY PHARMACY (OUTPATIENT)
Dept: PHARMACY | Facility: HOSPITAL | Age: 62
End: 2022-08-04

## 2022-08-04 ENCOUNTER — OFFICE VISIT (OUTPATIENT)
Dept: ENDOCRINOLOGY | Facility: CLINIC | Age: 62
End: 2022-08-04

## 2022-08-04 VITALS
HEIGHT: 63 IN | DIASTOLIC BLOOD PRESSURE: 68 MMHG | HEART RATE: 78 BPM | BODY MASS INDEX: 40.86 KG/M2 | SYSTOLIC BLOOD PRESSURE: 120 MMHG | WEIGHT: 230.6 LBS | OXYGEN SATURATION: 94 %

## 2022-08-04 DIAGNOSIS — E11.65 UNCONTROLLED TYPE 2 DIABETES MELLITUS WITH HYPERGLYCEMIA: Primary | ICD-10-CM

## 2022-08-04 LAB
EXPIRATION DATE: ABNORMAL
GLUCOSE BLDC GLUCOMTR-MCNC: 227 MG/DL (ref 70–130)
HBA1C MFR BLD: 8.5 %
Lab: ABNORMAL

## 2022-08-04 PROCEDURE — 82962 GLUCOSE BLOOD TEST: CPT | Performed by: NURSE PRACTITIONER

## 2022-08-04 PROCEDURE — 83036 HEMOGLOBIN GLYCOSYLATED A1C: CPT | Performed by: NURSE PRACTITIONER

## 2022-08-04 PROCEDURE — 99214 OFFICE O/P EST MOD 30 MIN: CPT | Performed by: NURSE PRACTITIONER

## 2022-08-04 RX ORDER — DULAGLUTIDE 3 MG/.5ML
3 INJECTION, SOLUTION SUBCUTANEOUS WEEKLY
Qty: 2 ML | Refills: 2 | Status: SHIPPED | OUTPATIENT
Start: 2022-08-04 | End: 2022-08-19 | Stop reason: SDUPTHER

## 2022-08-04 RX ORDER — MAGNESIUM OXIDE/MAG AA CHELATE 300 MG
300 CAPSULE ORAL 2 TIMES DAILY
COMMUNITY

## 2022-08-04 RX ORDER — SERTRALINE HYDROCHLORIDE 25 MG/1
25 TABLET, FILM COATED ORAL DAILY
COMMUNITY
End: 2022-08-04

## 2022-08-04 RX ORDER — UBIDECARENONE 100 MG
100 CAPSULE ORAL DAILY
COMMUNITY
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
## 2022-08-04 NOTE — ASSESSMENT & PLAN NOTE
-Diabetes is above goal with A1C up from 7.7 to 8.5.  -Increase Trulicity 3 mg weekly.  -Continue Basaglar 60 units BID.  -Continue Humalog 20 units TID with meals.  -Continue Metformin 1,000 mg BID.  -S/S hypoglycemia discussed with Rule of 15's advised.  -Continue checking BG regularly. Continue using Raymond CGM.  -Will reassess in 3 months.

## 2022-08-04 NOTE — PROGRESS NOTES
Specialty Pharmacy Patient Management Program  Endocrinology Reassessment     Mariola Beck is a 62 y.o. female with Type 2 Diabetes seen by an Endocrinology provider and enrolled in the Endocrinology Patient Management program offered by [facility].  A follow-up outreach was conducted, including assessment of continued therapy appropriateness, medication adherence, and side effect incidence and management for Trulicity, Levemir, Humalog, and Metformin.     Patient is currently taking Trulicity 1.5 mg weekly, Metformin 1,000 mg twice daily, Levemir 60 units in the morning and 60 units in the evening, and Humalog three times daily with meals on a scale. If BG <100 doesn't inject, 100-200 gives 20 units, 200-300 gives 30 units, and 300-400 gives 40 units. She uses FreeStyle Raymond to check BG with readings in the 300-400s. Patient denies low BG <70.     At the beginning of May she went into cardiac arrest and was hospitalized for 6 days which has contributed to elevated A1C. She also went 5 days without any long acting insulin about 1 week ago due to insurance coverage issues. Her insurance no longer wanted to pay for Basaglar and preferred Levemir. Patient is back on normal regimen now.     Patient denies personal/family history of thyroid cancer and denies issues with pancreas/pancreatitis.     Changes to Insurance Coverage or Financial Support  Insurance prefers Levemir over Basaglar     Relevant Past Medical History and Comorbidities  Relevant medical history and concomitant health conditions were discussed with the patient. The patient's chart has been reviewed for relevant past medical history and comorbid health conditions and updated as necessary.   Past Medical History:   Diagnosis Date   • Asthma    • Chronic headaches    • COPD (chronic obstructive pulmonary disease) (HCC)    • H/O bladder infections    • Heart attack (HCC)    • Heart disease    • Hypertension    • Type 2 diabetes  mellitus (HCC)      Social History     Socioeconomic History   • Marital status:    Tobacco Use   • Smoking status: Former Smoker     Packs/day: 1.00     Types: Cigarettes   • Smokeless tobacco: Never Used   Vaping Use   • Vaping Use: Never used   Substance and Sexual Activity   • Alcohol use: Not Currently   • Drug use: Never   • Sexual activity: Defer          Allergies  Known allergies and reactions were discussed with the patient. The patient's chart has been reviewed for allergy information and updated as necessary.   Plavix [clopidogrel]    Allergies reviewed by Felisa Mendez [facility] on 8/4/2022 at  3:51 PM    Relevant Laboratory Values  A1C Last 3 Results    HGBA1C Last 3 Results 12/6/21 8/4/22   Hemoglobin A1C 7.0 8.5           Lab Results   Component Value Date    HGBA1C 8.5 08/04/2022     No results found for: GLUCOSE, CALCIUM, NA, K, CO2, CL, BUN, CREATININE, EGFRIFAFRI, EGFRIFNONA, BCR, ANIONGAP  No results found for: CHOL, CHLPL, TRIG, HDL, LDL, LDLDIRECT      Current Medication List  This medication list has been reviewed with the patient and evaluated for any interactions or necessary modifications/recommendations, and updated to include all prescription medications, OTC medications, and supplements the patient is currently taking.  This list reflects what is contained in the patient's profile, which has also been marked as reviewed to communicate to other providers it is the most up to date version of the patient's current medication therapy.     Current Outpatient Medications:   •  aspirin 81 MG EC tablet, Take 81 mg by mouth Daily., Disp: , Rfl:   •  B-D ULTRAFINE III SHORT PEN 31G X 8 MM misc, See Admin Instructions., Disp: , Rfl:   •  Calcium Carbonate (CALCIUM 600 PO), Take 600 mg by mouth Daily., Disp: , Rfl:   •  coenzyme Q10 100 MG capsule, Take 100 mg by mouth Daily., Disp: , Rfl:   •  Continuous Blood Gluc  ([employer] Raymond 14 Day [place]) device, , Disp: , Rfl:   •  Continuous  Blood Gluc Sensor (FreeStyle Raymond 14 Day Sensor) [facility], Change every 14 days, Disp: , Rfl:   •  Cyanocobalamin (B-12) 1000 MCG sublingual tablet, Take 1 tablet by mouth Daily., Disp: , Rfl:   •  HumaLOG KwikPen 100 UNIT/ML solution pen-injector, Inject 20 Units under the skin into the appropriate area as directed 3 (Three) Times a Day., Disp: , Rfl:   •  insulin detemir (Levemir FlexTouch) 100 UNIT/ML injection, Inject 60 Units under the skin into the appropriate area as directed Daily. (Patient taking differently: Inject 60 Units under the skin into the appropriate area as directed 2 (Two) Times a Day.), Disp: 45 mL, Rfl: 2  •  Magnesium 300 MG capsule, Take 300 mg by mouth 2 (Two) Times a Day., Disp: , Rfl:   •  metFORMIN (GLUCOPHAGE) 1000 MG tablet, Take 1,000 mg by mouth 2 (Two) Times a Day., Disp: , Rfl:   •  metoprolol tartrate (LOPRESSOR) 50 MG tablet, Take 25 mg by mouth 2 (Two) Times a Day., Disp: , Rfl:   •  montelukast (SINGULAIR) 10 MG tablet, Take 10 mg by mouth Daily., Disp: , Rfl:   •  Omega-3 Fatty Acids (Odorless Coated Fish Oil) 1000 MG capsule delayed-release, 2 (two) times a day., Disp: , Rfl:   •  pantoprazole (PROTONIX) 40 MG EC tablet, Take 40 mg by mouth Daily., Disp: , Rfl:   •  prasugrel (EFFIENT) 10 MG tablet, Take 10 mg by mouth Daily., Disp: , Rfl:   •  rOPINIRole (REQUIP) 0.25 MG tablet, Take 0.25 mg by mouth Daily., Disp: , Rfl:   •  rosuvastatin (CRESTOR) 20 MG tablet, Take 20 mg by mouth Every Evening., Disp: , Rfl:   •  vitamin D3 125 MCG (5000 UT) capsule capsule, Take 5,000 Units by mouth Daily., Disp: , Rfl:   •  Dulaglutide (Trulicity) 3 MG/0.5ML solution pen-injector, Inject 0.5 mL under the skin into the appropriate area as directed 1 (One) Time Per Week., Disp: 2 mL, Rfl: 2    Medicines reviewed by Felisa Mendez RPH on 8/4/2022 at  3:56 PM    Drug Interactions  · Pharmacologic effects and plasma concentrations of Metformin may be increased by Ranolazine.  · ACE inhibitors  may enhance the adverse/toxic effect of metformin. This includes both a risk for hypoglycemia and for lactic acidosis.  · The hypoglycemic effect of Insulin may be increased or prolonged by Beta-Adrenergic Blockers. Minor cardiovascular abnormalities may occur during hypoglycemic episodes.    Recommended Medications Assessment  • Aspirin - Currently Taking  • Statin - Currently Taking  • ACEi/ARB - Not Indicated    Current 10-Year ASCVD Risk: need labs to calculate    Adverse Drug Reactions  • Adverse Reactions Experienced: None   • Plan for ADR Management: Not Required    Hospitalizations and Urgent Care Since Last Assessment  • Hospitalizations or Admissions: None  • ED Visits: None   • Urgent Office Visits: None    Adherence and Self-Administration  Adherence related patient's specialty therapy was discussed with the patient. The Adherence segment of this outreach has been reviewed and updated.     • Ongoing or New Barriers to Patient Adherence and/or Self-Administration: None  • Methods for Supporting Patient Adherence and/or Self-Administration: None Required    Goals of Therapy  Goals related to the patient's specialty therapy was discussed with the patient. The Patient Goals segment of this outreach has been reviewed and updated.    Goals     • HEMOGLOBIN A1C < 7             Reassessment Plan & Follow-Up  1. Medication Therapy Changes: None discussed with patient   2. Additional Plans, Therapy Recommendations, or Therapy Problems to Be Addressed: Patient's diabetes has worsened with A1C up to 8.5% from 7% which is expected due to recent hospitalization and time without her long acting insulin. We should begin to see improvements now that patient is back to taking her medication as prescribed.   · She would benefit from dose increase in Trulicity to 3 mg weekly as she is tolerating current dose well.       Patient does not wish to use [facility] services at this time due to: loyalty to independent pharmacy  - she want's a pill pack pharmacy       Attestation  I attest that the specialty medication(s) addressed above are appropriate for the patient based on my reassessment.  If the prescribed therapy is at any point deemed not appropriate based on the current or future assessments, a consultation will be initiated with the patient's specialty care provider to determine the best course of action. The revised plan of therapy will be documented along with any additional patient education provided.     Felisa Mendez RPH  8/4/2022  16:12 EDT

## 2022-08-11 ENCOUNTER — SPECIALTY PHARMACY (OUTPATIENT)
Dept: PHARMACY | Facility: HOSPITAL | Age: 62
End: 2022-08-11

## 2022-08-18 ENCOUNTER — HOSPITAL ENCOUNTER (OUTPATIENT)
Dept: HOSPITAL 79 - SLEEP | Age: 62
End: 2022-08-18
Attending: INTERNAL MEDICINE
Payer: COMMERCIAL

## 2022-08-18 DIAGNOSIS — G47.61: ICD-10-CM

## 2022-08-18 DIAGNOSIS — G47.33: Primary | ICD-10-CM

## 2022-08-19 RX ORDER — DULAGLUTIDE 3 MG/.5ML
3 INJECTION, SOLUTION SUBCUTANEOUS WEEKLY
Qty: 2 ML | Refills: 2 | Status: SHIPPED | OUTPATIENT
Start: 2022-08-19 | End: 2022-11-14 | Stop reason: DRUGHIGH

## 2022-08-19 NOTE — PROGRESS NOTES
PA for Trulicity was approved.     CaseId:[id number]  Status:Approved  Review Type:Prior Auth  Coverage Start Date:08/19/2022   Coverage End Date:08/19/2023  CaseId:[id number]

## 2022-08-19 NOTE — PROGRESS NOTES
Correct PA form for patient has been located un CoverMyMeds. Will send another request via ePA as no determination has been located from faxes we have sent.     In the future, when submitting a PA use the follow information for CoverMyMeds:  · Form: [employer] Electronic PA Form (2017 [employer])  · Member ID: [id number]  · Phone Number: [phone] (this is her husbands phone number that the plan is under, use this instead of the phone number listed in Epic)

## 2022-11-14 ENCOUNTER — SPECIALTY PHARMACY (OUTPATIENT)
Dept: PHARMACY | Facility: HOSPITAL | Age: 62
End: 2022-11-14

## 2022-11-14 ENCOUNTER — OFFICE VISIT (OUTPATIENT)
Dept: ENDOCRINOLOGY | Facility: CLINIC | Age: 62
End: 2022-11-14

## 2022-11-14 VITALS
SYSTOLIC BLOOD PRESSURE: 140 MMHG | OXYGEN SATURATION: 97 % | HEART RATE: 85 BPM | BODY MASS INDEX: 39.69 KG/M2 | WEIGHT: 224 LBS | HEIGHT: 63 IN | DIASTOLIC BLOOD PRESSURE: 82 MMHG

## 2022-11-14 DIAGNOSIS — E11.65 UNCONTROLLED TYPE 2 DIABETES MELLITUS WITH HYPERGLYCEMIA: Primary | ICD-10-CM

## 2022-11-14 LAB
EXPIRATION DATE: NORMAL
GLUCOSE BLDC GLUCOMTR-MCNC: 310 MG/DL (ref 70–130)
HBA1C MFR BLD: 9.3 %
Lab: NORMAL

## 2022-11-14 PROCEDURE — 82962 GLUCOSE BLOOD TEST: CPT | Performed by: NURSE PRACTITIONER

## 2022-11-14 PROCEDURE — 99214 OFFICE O/P EST MOD 30 MIN: CPT | Performed by: NURSE PRACTITIONER

## 2022-11-14 PROCEDURE — 83036 HEMOGLOBIN GLYCOSYLATED A1C: CPT | Performed by: NURSE PRACTITIONER

## 2022-11-14 RX ORDER — INSULIN DETEMIR 100 [IU]/ML
60 INJECTION, SOLUTION SUBCUTANEOUS 2 TIMES DAILY
Qty: 45 ML | Refills: 2 | Status: SHIPPED | OUTPATIENT
Start: 2022-11-14

## 2022-11-14 RX ORDER — FLASH GLUCOSE SENSOR
1 KIT MISCELLANEOUS
Qty: 2 EACH | Refills: 2 | Status: SHIPPED | OUTPATIENT
Start: 2022-11-14 | End: 2022-12-14

## 2022-11-14 RX ORDER — INSULIN LISPRO 100 [IU]/ML
30 INJECTION, SOLUTION INTRAVENOUS; SUBCUTANEOUS 3 TIMES DAILY
Qty: 30 ML | Refills: 2 | Status: SHIPPED | OUTPATIENT
Start: 2022-11-14 | End: 2023-02-16

## 2022-11-14 RX ORDER — DULAGLUTIDE 4.5 MG/.5ML
4.5 INJECTION, SOLUTION SUBCUTANEOUS WEEKLY
Qty: 2 ML | Refills: 2 | Status: SHIPPED | OUTPATIENT
Start: 2022-11-14 | End: 2023-01-31

## 2022-11-14 NOTE — ASSESSMENT & PLAN NOTE
-Diabetes is worsening with A1C up from 8.5 to 9.3.  -Increase Trulicity 4.5 mg weekly.  -Continue Basaglar 60 units BID.  -Increase Humalog 30 units TID with meals.  -Continue Metformin 1,000 mg BID.  -S/S hypoglycemia discussed with Rule of 15's advised.  -Continue checking BG regularly. Continue using Raymond CGM.  -Will reassess in 3 months.

## 2022-11-14 NOTE — PROGRESS NOTES
Chief Complaint   Patient presents with   • Diabetes     F/u        Mariola Beck is a 62 y.o. female had concerns including Diabetes (F/u).    T2DM.    She is checking blood sugars frequently with Freestyle Raymond CGM.  Current medications for diabetes include Trulicity 3 mg weekly, Levemir 60 units BID, Humalog 20 units TID, Metformin 1,000 mg BID.  She is on statin therapy.  Hypos: rarely    Is getting injections in the left eye for diabetic retinopathy.  She has a history of UTI and yeast infection.  She has not been following a diet/exercise program.    The following portions of the patient's history were reviewed and updated as appropriate: allergies, current medications, past family history, past medical history, past social history, past surgical history and problem list.      Review of Systems   Constitutional: Positive for fatigue.   Respiratory: Positive for shortness of breath.    Endocrine: Positive for polydipsia. Negative for polyuria.   All other systems reviewed and are negative.       Physical Exam  Vitals reviewed.   Constitutional:       Appearance: Normal appearance.   Cardiovascular:      Rate and Rhythm: Normal rate.      Pulses:           Dorsalis pedis pulses are 1+ on the right side and 1+ on the left side.        Posterior tibial pulses are 1+ on the right side and 1+ on the left side.   Pulmonary:      Effort: Pulmonary effort is normal.   Feet:      Right foot:      Protective Sensation: 10 sites tested. 3 sites sensed.      Skin integrity: Dry skin present.      Toenail Condition: Right toenails are abnormally thick and long.      Left foot:      Protective Sensation: 10 sites tested. 3 sites sensed.      Skin integrity: Dry skin present.      Toenail Condition: Left toenails are abnormally thick and long.      Comments: Diabetic Foot Exam Performed and Monofilament Test Performed  Neurological:      Mental Status: She is alert and oriented to person, place, and time.   Psychiatric:     "     Mood and Affect: Mood normal.         Behavior: Behavior normal.         Thought Content: Thought content normal.         Judgment: Judgment normal.        /82 (BP Location: Left arm, Patient Position: Sitting, Cuff Size: Adult)   Pulse 85   Ht 160 cm (63\")   Wt 102 kg (224 lb)   LMP  (LMP Unknown)   SpO2 97%   BMI 39.68 kg/m²      Labs and imaging    CMP:     Lipid Panel:  No results found for: CHOL, TRIG, HDL, VLDL, LDL  HbA1c:  Lab Results   Component Value Date    HGBA1C 9.3 11/14/2022    HGBA1C 8.5 08/04/2022     Glucose:    Lab Results   Component Value Date    POCGLU 310 (A) 11/14/2022     Microalbumin:  No results found for: MALBCRERATIO  TSH:  No results found for: TSH    Assessment and plan  Diagnoses and all orders for this visit:    1. Uncontrolled type 2 diabetes mellitus with hyperglycemia (HCC) (Primary)  Assessment & Plan:  -Diabetes is worsening with A1C up from 8.5 to 9.3.  -Increase Trulicity 4.5 mg weekly.  -Continue Basaglar 60 units BID.  -Increase Humalog 30 units TID with meals.  -Continue Metformin 1,000 mg BID.  -S/S hypoglycemia discussed with Rule of 15's advised.  -Continue checking BG regularly. Continue using Raymond CGM.  -Will reassess in 3 months.    Orders:  -     POC Glucose Fingerstick  -     POC Glycosylated Hemoglobin (Hb A1C)  -     Ambulatory Referral to Diabetic Education    Other orders  -     insulin detemir (Levemir FlexTouch) 100 UNIT/ML injection; Inject 60 Units under the skin into the appropriate area as directed 2 (Two) Times a Day.  Dispense: 45 mL; Refill: 2  -     HumaLOG KwikPen 100 UNIT/ML solution pen-injector; Inject 30 Units under the skin into the appropriate area as directed 3 (Three) Times a Day.  Dispense: 30 mL; Refill: 2  -     Dulaglutide (Trulicity) 4.5 MG/0.5ML solution pen-injector; Inject 0.5 mL under the skin into the appropriate area as directed 1 (One) Time Per Week.  Dispense: 2 mL; Refill: 2  -     Continuous Blood Gluc Sensor " (FreeStyle Raymond 14 Day Sensor) misc; 1 each Every 14 (Fourteen) Days. Change every 14 days  Dispense: 2 each; Refill: 2  -     metFORMIN (GLUCOPHAGE) 1000 MG tablet; Take 1 tablet by mouth 2 (Two) Times a Day.  Dispense: 60 tablet; Refill: 2       Return in about 3 months (around 2/14/2023) for Follow-up appointment, A1C. The patient was instructed to contact the clinic with any interval questions or concerns.    This document has been electronically signed by KHURRAM Maldonado  November 14, 2022 10:36 EST  Endocrinology

## 2022-11-14 NOTE — PROGRESS NOTES
Specialty Pharmacy Patient Management Program  Endocrinology Reassessment     Mariola Beck is a 62 y.o. female with Type 2 Diabetes seen by an Endocrinology provider and enrolled in the Endocrinology Patient Management program offered by [facility].  A follow-up outreach was conducted, including assessment of continued therapy appropriateness, medication adherence, and side effect incidence and management for Trulicity, Levemir, Humalog, and Metformin.     Patient is currently taking Trulicity 3 mg weekly, Metformin 1,000 mg twice daily, Levemir 60 units in the morning and 60 units in the evening, and Humalog 20 units three times daily with meals. She uses FreeStyle Raymond to check BG with readings between 180-300s. Patient denies low BG <70. She reports that her diet has been contributing to high BG as she is eating frozen pizza, hot dogs, and meatloaf with ketchup and honey.     Patient denies personal/family history of thyroid cancer, denies issues with pancreas/pancreatitis, and reports issues with recurring UTI/yeast infections.     Changes to Insurance Coverage or Financial Support  None    Relevant Past Medical History and Comorbidities  Relevant medical history and concomitant health conditions were discussed with the patient. The patient's chart has been reviewed for relevant past medical history and comorbid health conditions and updated as necessary.   Past Medical History:   Diagnosis Date   • Asthma    • Chronic headaches    • COPD (chronic obstructive pulmonary disease) (HCC)    • H/O bladder infections    • Heart attack (HCC)    • Heart disease    • Hypertension    • Type 2 diabetes mellitus (HCC)      Social History     Socioeconomic History   • Marital status:    Tobacco Use   • Smoking status: Former     Packs/day: 1.00     Types: Cigarettes   • Smokeless tobacco: Never   Vaping Use   • Vaping Use: Never used   Substance and Sexual Activity   • Alcohol use: Not  Currently   • Drug use: Never   • Sexual activity: Defer       Problem list reviewed by Felisa Mendez RPH on 11/14/2022 at  9:35 AM     Allergies  Known allergies and reactions were discussed with the patient. The patient's chart has been reviewed for allergy information and updated as necessary.   Plavix [clopidogrel]    Allergies reviewed by Felisa Mendez RPH on 11/14/2022 at  9:34 AM    Relevant Laboratory Values  A1C Last 3 Results    HGBA1C Last 3 Results 12/6/21 8/4/22 11/14/22   Hemoglobin A1C 7.0 8.5 9.3           Lab Results   Component Value Date    HGBA1C 9.3 11/14/2022     No results found for: GLUCOSE, CALCIUM, NA, K, CO2, CL, BUN, CREATININE, EGFRIFAFRI, EGFRIFNONA, BCR, ANIONGAP  No results found for: CHOL, CHLPL, TRIG, HDL, LDL, LDLDIRECT      Current Medication List  This medication list has been reviewed with the patient and evaluated for any interactions or necessary modifications/recommendations, and updated to include all prescription medications, OTC medications, and supplements the patient is currently taking.  This list reflects what is contained in the patient's profile, which has also been marked as reviewed to communicate to other providers it is the most up to date version of the patient's current medication therapy.     Current Outpatient Medications:   •  aspirin 81 MG EC tablet, Take 81 mg by mouth Daily., Disp: , Rfl:   •  B-D ULTRAFINE III SHORT PEN 31G X 8 MM misc, See Admin Instructions., Disp: , Rfl:   •  Calcium Carbonate (CALCIUM 600 PO), Take 600 mg by mouth Daily., Disp: , Rfl:   •  coenzyme Q10 100 MG capsule, Take 100 mg by mouth Daily., Disp: , Rfl:   •  Continuous Blood Gluc  (FreeStyle Raymond 14 Day [place]) device, , Disp: , Rfl:   •  Continuous Blood Gluc Sensor (FreeStyle Raymond 14 Day Sensor) misc, Change every 14 days, Disp: , Rfl:   •  Cyanocobalamin (B-12) 1000 MCG sublingual tablet, Take 1 tablet by mouth Daily., Disp: , Rfl:   •  Dulaglutide (Trulicity) 3  MG/0.5ML solution pen-injector, Inject 0.5 mL under the skin into the appropriate area as directed 1 (One) Time Per Week., Disp: 2 mL, Rfl: 2  •  HumaLOG KwikPen 100 UNIT/ML solution pen-injector, Inject 20 Units under the skin into the appropriate area as directed 3 (Three) Times a Day., Disp: , Rfl:   •  insulin detemir (Levemir FlexTouch) 100 UNIT/ML injection, Inject 60 Units under the skin into the appropriate area as directed Daily. (Patient taking differently: Inject 60 Units under the skin into the appropriate area as directed 2 (Two) Times a Day.), Disp: 45 mL, Rfl: 2  •  Magnesium 300 MG capsule, Take 300 mg by mouth 2 (Two) Times a Day., Disp: , Rfl:   •  metFORMIN (GLUCOPHAGE) 1000 MG tablet, Take 1,000 mg by mouth 2 (Two) Times a Day., Disp: , Rfl:   •  metoprolol tartrate (LOPRESSOR) 50 MG tablet, Take 25 mg by mouth 2 (Two) Times a Day., Disp: , Rfl:   •  montelukast (SINGULAIR) 10 MG tablet, Take 10 mg by mouth Daily., Disp: , Rfl:   •  Omega-3 Fatty Acids (Odorless Coated Fish Oil) 1000 MG capsule delayed-release, 2 (two) times a day., Disp: , Rfl:   •  pantoprazole (PROTONIX) 40 MG EC tablet, Take 40 mg by mouth Daily., Disp: , Rfl:   •  prasugrel (EFFIENT) 10 MG tablet, Take 10 mg by mouth Daily., Disp: , Rfl:   •  rOPINIRole (REQUIP) 0.25 MG tablet, Take 0.25 mg by mouth Daily., Disp: , Rfl:   •  rosuvastatin (CRESTOR) 20 MG tablet, Take 20 mg by mouth Every Evening., Disp: , Rfl:   •  vitamin D3 125 MCG (5000 UT) capsule capsule, Take 5,000 Units by mouth Daily., Disp: , Rfl:     Medicines reviewed by Felisa Mendez [facility] on 11/14/2022 at  9:35 AM    Drug Interactions  · The hypoglycemic effect of Insulin may be increased or prolonged by metoprolol. Minor cardiovascular abnormalities may occur during hypoglycemic episodes.    Recommended Medications Assessment  • Aspirin - Currently Taking  • Statin - Currently Taking  • ACEi/ARB - Not taking anymore    Current 10-Year ASCVD Risk: need labs to  calculate    Adverse Drug Reactions  • Adverse Reactions Experienced: None   • Plan for ADR Management: Not Required    Hospitalizations and Urgent Care Since Last Assessment  • Hospitalizations or Admissions: None  • ED Visits: None   • Urgent Office Visits: None    Adherence and Self-Administration  Adherence related patient's specialty therapy was discussed with the patient. The Adherence segment of this outreach has been reviewed and updated.     • Ongoing or New Barriers to Patient Adherence and/or Self-Administration: None  • Methods for Supporting Patient Adherence and/or Self-Administration: None Required    Goals of Therapy  Goals related to the patient's specialty therapy was discussed with the patient. The Patient Goals segment of this outreach has been reviewed and updated.    Goals     • HEMOGLOBIN A1C < 7             Reassessment Plan & Follow-Up  1. Patient's diabetes continues to worsen with A1C up to 9.3% from 8.5%. She would benefit from dose increase in mealtime insulin dose and Trulicity to 4.5 mg weekly as she is tolerating current dose well.   2. Medication Therapy Changes: None discussed with patient   3. Additional Plans, Therapy Recommendations, or Therapy Problems to Be Addressed:   · Referral to diabetes education would be beneficial as patient needs help determining which foods to avoid and would like updated resources to keep at home for reference.  · Updated labs would be beneficial to determine kidney function and appropriateness of cholesterol management. We do not have any labs on patient's profile.     Patient does not wish to use [facility] services at this time due to: loyalty to independent pharmacy - she want's a pill pack pharmacy       Attestation  I attest that the specialty medication(s) addressed above are appropriate for the patient based on my reassessment.  If the prescribed therapy is at any point deemed not appropriate based on the current or future assessments, a  consultation will be initiated with the patient's specialty care provider to determine the best course of action. The revised plan of therapy will be documented along with any additional patient education provided.     Felisa Mendez RPH  11/14/2022  09:49 EST

## 2022-11-14 NOTE — PROGRESS NOTES
PA for Trulicity 4.5 was approved.    CaseId:[id number]  Status:Approved  Review Type:Prior Auth  Coverage Start Date:11/14/2022  Coverage End Date:11/14/2023;

## 2022-11-14 NOTE — PATIENT INSTRUCTIONS
Trulicity 4.5 mg weekly.  Metformin 1000 mg twice a day.  Levemir 60 units twice a day.  Humalog 30 units three times a day.

## 2022-11-28 ENCOUNTER — DOCUMENTATION (OUTPATIENT)
Dept: DIABETES SERVICES | Facility: HOSPITAL | Age: 62
End: 2022-11-28

## 2022-11-29 ENCOUNTER — EDUCATION (OUTPATIENT)
Dept: DIABETES SERVICES | Facility: HOSPITAL | Age: 62
End: 2022-11-29

## 2022-11-29 NOTE — CONSULTS
Diabetes Education  Assessment/Teaching    Patient Name:  Mariola Beck  YOB: 1960  MRN: [id number]  Admit Date:  (Not on file)      Assessment Date:  11/29/2022      Flowsheet Row Most Recent Value   DM Education Needs    Meter Has own   Meter Type Other (comment)  [CGM]   Medication Insulin, Oral   Problem Solving Hypoglycemia, Hyperglycemia, Sick days, Signs, Symptoms, Treatment   Reducing Risks Retinopathy, Neuropathy, Cardiovascular, Immunizations, Foot care, Dental exam, Eye exam, Blood pressure, Lipids, A1C testing   Healthy Eating Basic meal plan provided   Physical Activity Walking   Physical Activity Frequency Occasionally   Healthy Coping Appropriate   Motivation Engaged, Moderate   Teaching Method Explanation, Discussion, Handouts   Patient Response Verbalized understanding            Other Comments:  A1C 9.3  Healthy eating:  Patient states counts her carb's now and  will decrease carb intake and will try to eat healthier. Patient states she will keep her carb intake to 60 carb's a meal with two 15 carb snacks a day.   Being active:   Patient states she will walk at least 10 minutes three times a week and increase the times in two weeks if she can tolerate. Patient states she is having problems with her legs and not able to exercise but states she walks some in the house while doing lite housework when she can.  Monitoring:   Patient states that a CGM and has had some issues with getting the wrong supplies but has it fixed.  Medication:  Patient will take medications as prescribed. Patient was able to list her medications and the dose as prescribed.  Problem solving:  Patient states the signs and symptoms of hypo/hyperglycemia and the treatment for both.   Reducing risk:  Patient states she checks her feet nightly and does not walk without something to protect her feet. Patient states she goes to her follow up appointments and goes to the eye doctor and dentist when needed and on annual  visits.   Healthy coping:  Patient has a good support team at home and is able to talk to them for help when needed.   Patient received a call and was educated on diet, activity, checking blood glucose, taking medication as prescribed, checking feet daily and S/S of hypo/hyperglycemia. Patient was educated on sick rule days. Patient was mailed a packet of information that includes ADA handouts, AADE 7 handout, fast food book and grocery shopping book for help when shopping along with office contact number.  Patient had no questions or concerns. Thank you.        Electronically signed by:  Елена Valdez RN  11/29/22 15:46 EST

## 2022-12-14 RX ORDER — PROCHLORPERAZINE 25 MG/1
1 SUPPOSITORY RECTAL
Qty: 1 EACH | Refills: 3 | Status: SHIPPED | OUTPATIENT
Start: 2022-12-14 | End: 2023-02-16 | Stop reason: SDUPTHER

## 2022-12-14 RX ORDER — INSULIN PMP CART,AUT,G6/7,CNTR
1 EACH SUBCUTANEOUS TAKE AS DIRECTED
Qty: 1 KIT | Refills: 0 | Status: SHIPPED | OUTPATIENT
Start: 2022-12-14 | End: 2023-02-16

## 2022-12-14 RX ORDER — PROCHLORPERAZINE 25 MG/1
1 SUPPOSITORY RECTAL CONTINUOUS
Qty: 1 EACH | Refills: 0 | Status: SHIPPED | OUTPATIENT
Start: 2022-12-14

## 2022-12-14 RX ORDER — PROCHLORPERAZINE 25 MG/1
SUPPOSITORY RECTAL
Qty: 3 EACH | Refills: 11 | Status: SHIPPED | OUTPATIENT
Start: 2022-12-14 | End: 2023-02-16 | Stop reason: SDUPTHER

## 2022-12-16 ENCOUNTER — PRIOR AUTHORIZATION (OUTPATIENT)
Dept: ENDOCRINOLOGY | Facility: CLINIC | Age: 62
End: 2022-12-16

## 2022-12-16 NOTE — TELEPHONE ENCOUNTER
Mariola Beck Garrett: [id number] - PA Case ID: [id number] - Rx #: [id number] help? Call us at [phone]  Outcome  Approvedtoday  CaseId:[id number];Status:Approved;Review Type:Prior Auth;Coverage Start Date:12/14/2022;Coverage End Date:12/16/2023;  Drug  Dexcom G6 Transmitter  Form  [employer] Commercial Electronic PA Form (2017 NCPDP)

## 2022-12-19 ENCOUNTER — TELEPHONE (OUTPATIENT)
Dept: ENDOCRINOLOGY | Facility: CLINIC | Age: 62
End: 2022-12-19

## 2022-12-19 NOTE — TELEPHONE ENCOUNTER
If she is tolerating this, then yes she can.  If she starts having low blood sugars she can let us know and we can adjust her insulin levels.

## 2022-12-19 NOTE — TELEPHONE ENCOUNTER
Pt called to see if you would like for her to still take Trulicity? She has all her Omnipod set up just has to get trained on it.

## 2023-01-18 NOTE — TELEPHONE ENCOUNTER
Pt wants us to check on Omnipod training. She said  said something about order needing to be signed before she could train. I told her we would check on it and let her know as she may need a refill of Humalog pens sent in if she is not trained soon.

## 2023-01-31 RX ORDER — INSULIN DETEMIR 100 [IU]/ML
60 INJECTION, SOLUTION SUBCUTANEOUS 2 TIMES DAILY
Qty: 40 ML | Refills: 2 | Status: SHIPPED | OUTPATIENT
Start: 2023-01-31

## 2023-01-31 RX ORDER — CALCIUM CARB/VITAMIN D3/VIT K1 500-100-40
TABLET,CHEWABLE ORAL
Qty: 100 EACH | Refills: 2 | Status: SHIPPED | OUTPATIENT
Start: 2023-01-31

## 2023-01-31 RX ORDER — DULAGLUTIDE 4.5 MG/.5ML
4.5 INJECTION, SOLUTION SUBCUTANEOUS WEEKLY
Qty: 2 ML | Refills: 2 | Status: SHIPPED | OUTPATIENT
Start: 2023-01-31 | End: 2023-01-31 | Stop reason: SDUPTHER

## 2023-01-31 RX ORDER — DULAGLUTIDE 4.5 MG/.5ML
4.5 INJECTION, SOLUTION SUBCUTANEOUS WEEKLY
Qty: 2 ML | Refills: 2 | Status: SHIPPED | OUTPATIENT
Start: 2023-01-31 | End: 2023-02-16 | Stop reason: SDUPTHER

## 2023-01-31 NOTE — TELEPHONE ENCOUNTER
Pt sts that right now her pharmacy is out of Levemir pens, but if you would send her in the vials and syringes she would do it that way.

## 2023-02-16 ENCOUNTER — SPECIALTY PHARMACY (OUTPATIENT)
Dept: PHARMACY | Facility: HOSPITAL | Age: 63
End: 2023-02-16
Payer: COMMERCIAL

## 2023-02-16 ENCOUNTER — OFFICE VISIT (OUTPATIENT)
Dept: ENDOCRINOLOGY | Facility: CLINIC | Age: 63
End: 2023-02-16
Payer: COMMERCIAL

## 2023-02-16 VITALS
HEIGHT: 63 IN | SYSTOLIC BLOOD PRESSURE: 112 MMHG | DIASTOLIC BLOOD PRESSURE: 62 MMHG | BODY MASS INDEX: 38.41 KG/M2 | WEIGHT: 216.8 LBS | HEART RATE: 68 BPM | OXYGEN SATURATION: 97 %

## 2023-02-16 DIAGNOSIS — E11.65 UNCONTROLLED TYPE 2 DIABETES MELLITUS WITH HYPERGLYCEMIA: Primary | ICD-10-CM

## 2023-02-16 LAB
EXPIRATION DATE: NORMAL
GLUCOSE BLDC GLUCOMTR-MCNC: 252 MG/DL (ref 70–130)
HBA1C MFR BLD: 9.1 %
Lab: NORMAL

## 2023-02-16 PROCEDURE — 99213 OFFICE O/P EST LOW 20 MIN: CPT | Performed by: NURSE PRACTITIONER

## 2023-02-16 PROCEDURE — 83036 HEMOGLOBIN GLYCOSYLATED A1C: CPT | Performed by: NURSE PRACTITIONER

## 2023-02-16 PROCEDURE — 82962 GLUCOSE BLOOD TEST: CPT | Performed by: NURSE PRACTITIONER

## 2023-02-16 RX ORDER — DULAGLUTIDE 4.5 MG/.5ML
4.5 INJECTION, SOLUTION SUBCUTANEOUS WEEKLY
Qty: 2 ML | Refills: 2 | Status: CANCELLED | OUTPATIENT
Start: 2023-02-16

## 2023-02-16 RX ORDER — PROCHLORPERAZINE 25 MG/1
SUPPOSITORY RECTAL
Qty: 3 EACH | Refills: 5 | Status: SHIPPED | OUTPATIENT
Start: 2023-02-16

## 2023-02-16 RX ORDER — DULAGLUTIDE 4.5 MG/.5ML
4.5 INJECTION, SOLUTION SUBCUTANEOUS WEEKLY
Qty: 2 ML | Refills: 5 | Status: SHIPPED | OUTPATIENT
Start: 2023-02-16

## 2023-02-16 RX ORDER — INSULIN PMP CART,AUT,G6/7,CNTR
1 EACH SUBCUTANEOUS EVERY OTHER DAY
Qty: 15 EACH | Refills: 5 | Status: CANCELLED | OUTPATIENT
Start: 2023-02-16

## 2023-02-16 RX ORDER — INSULIN LISPRO 100 [IU]/ML
INJECTION, SOLUTION INTRAVENOUS; SUBCUTANEOUS
Qty: 50 ML | Refills: 5 | Status: SHIPPED | OUTPATIENT
Start: 2023-02-16

## 2023-02-16 RX ORDER — INSULIN PMP CART,AUT,G6/7,CNTR
1 EACH SUBCUTANEOUS EVERY OTHER DAY
Qty: 45 EACH | Refills: 1 | Status: SHIPPED | OUTPATIENT
Start: 2023-02-16

## 2023-02-16 RX ORDER — NITROGLYCERIN 0.4 MG/1
TABLET SUBLINGUAL
COMMUNITY
Start: 2023-01-04

## 2023-02-16 RX ORDER — PROCHLORPERAZINE 25 MG/1
1 SUPPOSITORY RECTAL
Qty: 1 EACH | Refills: 1 | Status: SHIPPED | OUTPATIENT
Start: 2023-02-16

## 2023-02-16 NOTE — ASSESSMENT & PLAN NOTE
-Diabetes is worsening with A1C up from 9.3 to 9.1.  -Continue Trulicity 4.5 mg weekly. Patient has no personal history of pancreatitis, no family history of MEN syndrome or medullary thyroid cancer. Possible side effects including nausea, bloating, other GI upset and rarely pancreatitis were discussed. She was advised to call the office with any symptoms or concerns.   -Continue insulin at current pump settings in Omnipod 5 insulin pump.  -Continue Metformin 1,000 mg BID.  -S/S hypoglycemia discussed with Rule of 15's advised.  -Continue checking BG regularly. Continue using Dexcom CGM.  -Will reassess in 4 months.

## 2023-02-16 NOTE — PROGRESS NOTES
"Chief Complaint   Patient presents with   • Diabetes        Mariola Beck is a 62 y.o. female had concerns including Diabetes.    T2DM.    She is checking blood sugars frequently with Dexcom CGM.  Current medications for diabetes include Trulicity 4.5 mg weekly-she has missed a dose or 2 d/t not being able to get it from the pharmacy, insulin via an Omnipod 5 insulin pump, Metformin 1,000 mg BID.  She is on statin therapy.  Hypos: rarely    Is getting injections in the left eye for diabetic retinopathy.  She has a history of UTI and yeast infection.  She has not been following a diet/exercise program.  She just recently started her insulin pump.    The following portions of the patient's history were reviewed and updated as appropriate: allergies, current medications, past family history, past medical history, past social history, past surgical history and problem list.      Review of Systems   Constitutional: Positive for fatigue.   Respiratory: Positive for shortness of breath.    Endocrine: Positive for polydipsia. Negative for polyuria.   All other systems reviewed and are negative.       Physical Exam  Vitals reviewed.   Constitutional:       Appearance: Normal appearance.   Cardiovascular:      Rate and Rhythm: Normal rate.   Pulmonary:      Effort: Pulmonary effort is normal.   Skin:     General: Skin is warm.   Neurological:      General: No focal deficit present.      Mental Status: She is alert and oriented to person, place, and time.   Psychiatric:         Mood and Affect: Mood normal.         Behavior: Behavior normal.         Thought Content: Thought content normal.         Judgment: Judgment normal.        /62 (BP Location: Left arm, Patient Position: Sitting, Cuff Size: Adult)   Pulse 68   Ht 160 cm (63\")   Wt 98.3 kg (216 lb 12.8 oz)   LMP  (LMP Unknown)   SpO2 97%   BMI 38.40 kg/m²      Labs and imaging    CMP:     Lipid Panel:  No results found for: CHOL, TRIG, HDL, VLDL, " LDL  HbA1c:  Lab Results   Component Value Date    HGBA1C 9.1 02/16/2023    HGBA1C 9.3 11/14/2022     Glucose:    Lab Results   Component Value Date    POCGLU 252 (A) 02/16/2023     Microalbumin:  No results found for: MALBCRERATIO  TSH:  No results found for: TSH    Assessment and plan  Diagnoses and all orders for this visit:    1. Uncontrolled type 2 diabetes mellitus with hyperglycemia (HCC) (Primary)  Assessment & Plan:  -Diabetes is worsening with A1C up from 9.3 to 9.1.  -Continue Trulicity 4.5 mg weekly. Patient has no personal history of pancreatitis, no family history of MEN syndrome or medullary thyroid cancer. Possible side effects including nausea, bloating, other GI upset and rarely pancreatitis were discussed. She was advised to call the office with any symptoms or concerns.   -Continue insulin at current pump settings in Omnipod 5 insulin pump.  -Continue Metformin 1,000 mg BID.  -S/S hypoglycemia discussed with Rule of 15's advised.  -Continue checking BG regularly. Continue using Dexcom CGM.  -Will reassess in 4 months.    Orders:  -     POC Glucose Fingerstick  -     POC Glycosylated Hemoglobin (Hb A1C)       Return in about 4 months (around 6/16/2023) for Follow-up appointment, A1C. The patient was instructed to contact the clinic with any interval questions or concerns.    This document has been electronically signed by KHURRAM Maldonado  February 16, 2023 10:26 EST  Endocrinology

## 2023-02-16 NOTE — PROGRESS NOTES
Specialty Pharmacy Patient Management Program  Endocrinology Reassessment     Mariola Beck is a 62 y.o. female with Type 2 Diabetes seen by an Endocrinology provider and enrolled in the Endocrinology Patient Management program offered by [facility].  A follow-up outreach was conducted, including assessment of continued therapy appropriateness, medication adherence, and side effect incidence and management for Trulicity, Humalog, Omnipod, and Metformin.     Patient is currently taking Trulicity 4.5 mg weekly, Metformin 1,000 mg twice daily, Omnipod with Humalog. Patient reports her BG still have not came down but doing better than before. Her average is 180s. She denies any low BG < 70 mg/dL, lowest she has gone was 90 mg/dL and denied hypoglycemia symptoms.    Patient reports having difficulty getting Omnipods and Trulicity from her pharmacy. She would like to fill her Omnipods, Trulicity, and insulin at the apothecary.     Patient denies personal/family history of thyroid cancer and denies issues with pancreas/pancreatitis.     Changes to Insurance Coverage or Financial Support  None    Relevant Past Medical History and Comorbidities  Relevant medical history and concomitant health conditions were discussed with the patient. The patient's chart has been reviewed for relevant past medical history and comorbid health conditions and updated as necessary.   Past Medical History:   Diagnosis Date   • Asthma    • Chronic headaches    • COPD (chronic obstructive pulmonary disease) (HCC)    • H/O bladder infections    • Heart attack (HCC)    • Heart disease    • Hypertension    • Type 2 diabetes mellitus (HCC)      Social History     Socioeconomic History   • Marital status:    Tobacco Use   • Smoking status: Former     Packs/day: 1.00     Types: Cigarettes   • Smokeless tobacco: Never   Vaping Use   • Vaping Use: Never used   Substance and Sexual Activity   • Alcohol use: Not Currently    • Drug use: Never   • Sexual activity: Defer       Problem list reviewed by Adeline Edge RPH on 2/16/2023 at 10:02 AM  Problem list reviewed by Adeline Edge RPH on 2/16/2023 at 10:59 AM  Problem list reviewed by Adeline Edge RPH on 2/16/2023 at 10:59 AM  Allergies  Known allergies and reactions were discussed with the patient. The patient's chart has been reviewed for allergy information and updated as necessary.   Plavix [clopidogrel]    Allergies reviewed by Adeline Edge RPH on 2/16/2023 at 10:58 AM    Relevant Laboratory Values  A1C Last 3 Results    HGBA1C Last 3 Results 8/4/22 11/14/22 2/16/23   Hemoglobin A1C 8.5 9.3 9.1           Lab Results   Component Value Date    HGBA1C 9.1 02/16/2023     No results found for: GLUCOSE, CALCIUM, NA, K, CO2, CL, BUN, CREATININE, EGFRIFAFRI, EGFRIFNONA, BCR, ANIONGAP  No results found for: CHOL, CHLPL, TRIG, HDL, LDL, LDLDIRECT      Current Medication List  This medication list has been reviewed with the patient and evaluated for any interactions or necessary modifications/recommendations, and updated to include all prescription medications, OTC medications, and supplements the patient is currently taking.  This list reflects what is contained in the patient's profile, which has also been marked as reviewed to communicate to other providers it is the most up to date version of the patient's current medication therapy.     Current Outpatient Medications:   •  Dulaglutide (Trulicity) 4.5 MG/0.5ML solution pen-injector, Inject 0.5 mL under the skin into the appropriate area as directed 1 (One) Time Per Week., Disp: 2 mL, Rfl: 5  •  Insulin Lispro (HumaLOG) 100 UNIT/ML injection, For use in insulin pump.  Maximum Daily Dose: 150 units, Disp: 50 mL, Rfl: 5  •  aspirin 81 MG EC tablet, Take 81 mg by mouth Daily., Disp: , Rfl:   •  B-D ULTRAFINE III SHORT PEN 31G X 8 MM misc, See Admin Instructions., Disp: , Rfl:   •  Calcium Carbonate (CALCIUM 600 PO), Take 600  "mg by mouth Daily., Disp: , Rfl:   •  coenzyme Q10 100 MG capsule, Take 100 mg by mouth Daily., Disp: , Rfl:   •  Continuous Blood Gluc  (Dexcom G6 ) device, 1 each Continuous., Disp: 1 each, Rfl: 0  •  Continuous Blood Gluc Sensor (Dexcom G6 Sensor), Every 10 (Ten) Days., Disp: 3 each, Rfl: 11  •  Continuous Blood Gluc Transmit (Dexcom G6 Transmitter) misc, 1 each Every 3 (Three) Months., Disp: 1 each, Rfl: 3  •  Cyanocobalamin (B-12) 1000 MCG sublingual tablet, Take 1 tablet by mouth Daily., Disp: , Rfl:   •  insulin detemir (Levemir FlexTouch) 100 UNIT/ML injection, Inject 60 Units under the skin into the appropriate area as directed 2 (Two) Times a Day., Disp: 45 mL, Rfl: 2  •  insulin detemir (Levemir) 100 UNIT/ML injection, Inject 60 Units under the skin into the appropriate area as directed 2 (Two) Times a Day., Disp: 40 mL, Rfl: 2  •  Insulin Disposable Pump (Omnipod 5 G6 Pod, Gen 5,) misc, 1 each Every Other Day. Change every other day, Disp: 45 each, Rfl: 1  •  Insulin Syringe 31G X 5/16\" 1 ML misc, To administer insulin BID., Disp: 100 each, Rfl: 2  •  Magnesium 300 MG capsule, Take 300 mg by mouth 2 (Two) Times a Day., Disp: , Rfl:   •  metFORMIN (GLUCOPHAGE) 1000 MG tablet, Take 1 tablet by mouth 2 (Two) Times a Day., Disp: 60 tablet, Rfl: 2  •  metoprolol tartrate (LOPRESSOR) 50 MG tablet, Take 25 mg by mouth 2 (Two) Times a Day., Disp: , Rfl:   •  montelukast (SINGULAIR) 10 MG tablet, Take 10 mg by mouth Daily., Disp: , Rfl:   •  Multiple Vitamins-Minerals (ICAPS AREDS 2 PO), Take 2 capsules by mouth 2 (Two) Times a Day., Disp: , Rfl:   •  nitroglycerin (NITROSTAT) 0.4 MG SL tablet, DISSOLVE 1 TABLET UNDER THE TONGUE AS NEEDED, Disp: , Rfl:   •  Omega-3 Fatty Acids (Odorless Coated Fish Oil) 1000 MG capsule delayed-release, 2 (two) times a day., Disp: , Rfl:   •  pantoprazole (PROTONIX) 40 MG EC tablet, Take 40 mg by mouth Daily., Disp: , Rfl:   •  prasugrel (EFFIENT) 10 MG tablet, " Take 10 mg by mouth Daily., Disp: , Rfl:   •  rOPINIRole (REQUIP) 0.25 MG tablet, Take 0.25 mg by mouth Daily., Disp: , Rfl:   •  rosuvastatin (CRESTOR) 20 MG tablet, Take 20 mg by mouth Every Evening., Disp: , Rfl:   •  vitamin D3 125 MCG (5000 UT) capsule capsule, Take 5,000 Units by mouth Daily., Disp: , Rfl:     Medicines reviewed by Adeline Edge [facility] on 2/16/2023 at 10:59 AM    Drug Interactions  · Pharmacologic effects and plasma concentrations of Metformin may be increased by Ranolazine.  · ACE inhibitors may enhance the adverse/toxic effect of metformin. This includes both a risk for hypoglycemia and for lactic acidosis.  · The hypoglycemic effect of Insulin may be increased or prolonged by Beta-Adrenergic Blockers. Minor cardiovascular abnormalities may occur during hypoglycemic episodes.    Recommended Medications Assessment  • Aspirin - Currently Taking  • Statin - Currently Taking  • ACEi/ARB - Not Indicated    Current 10-Year ASCVD Risk: need labs to calculate    Adverse Drug Reactions  • Adverse Reactions Experienced: None   • Plan for ADR Management: Not Required    Hospitalizations and Urgent Care Since Last Assessment  • Hospitalizations or Admissions: None  • ED Visits: None   • Urgent Office Visits: None    Adherence and Self-Administration  Adherence related patient's specialty therapy was discussed with the patient. The Adherence segment of this outreach has been reviewed and updated.     • Ongoing or New Barriers to Patient Adherence and/or Self-Administration: None  • Methods for Supporting Patient Adherence and/or Self-Administration: None Required    Goals of Therapy  Goals related to the patient's specialty therapy was discussed with the patient. The Patient Goals segment of this outreach has been reviewed and updated.    Goals     • Consistently take medications as prescribed     • HEMOGLOBIN A1C < 7             Reassessment Plan & Follow-Up  1. Patient's diabetes has improved with A1C  down to 9.1% from 9.3%. Patient would like to begin using apothecary services and  medications.   2. Medication Therapy Changes:   · Continue Dexcom and Omnipod  · Continue Metformin 1,000 mg BID   · Continue Trulicity 4.5 mg weekly   · Increase MDD of Humalog for Omnipod to 150 units  3. Additional Plans, Therapy Recommendations, or Therapy Problems to Be Addressed:   · Updated labs would be beneficial to calculate ASCVD risk score.  · Will send updated prescriptions to apothecary and coordinate medication refills.   · Patient would like to  from the apothecary and does not wish to utilize mail order services.  4. Pharmacist to perform regular reassessments no more than (6) months from the previous assessment.  5. Welcome information and patient satisfaction survey to be sent by retail team with patient's initial fill.  6. Care Coordinator to set up future refill outreaches, coordinate prescription delivery, and escalate clinical questions to pharmacist.        Attestation  I attest that the specialty medication(s) addressed above are appropriate for the patient based on my reassessment.  If the prescribed therapy is at any point deemed not appropriate based on the current or future assessments, a consultation will be initiated with the patient's specialty care provider to determine the best course of action. The revised plan of therapy will be documented along with any additional patient education provided.     Adeline Edge RPH  2/16/2023  10:59 EST

## 2023-02-16 NOTE — PROGRESS NOTES
Issues getting Trulicity  Takes AREDs 2 2 capsules twice daily  Check Biotin     Still have not came down but doing better than before  Average 180s  Using insulin faster than 2 days  Manual bolus with meals   No lows, lowest was 90 but did not have symptoms.  New Rx for Omnipods

## 2023-03-03 ENCOUNTER — SPECIALTY PHARMACY (OUTPATIENT)
Dept: PHARMACY | Facility: HOSPITAL | Age: 63
End: 2023-03-03
Payer: COMMERCIAL

## 2023-03-13 ENCOUNTER — SPECIALTY PHARMACY (OUTPATIENT)
Dept: PHARMACY | Facility: HOSPITAL | Age: 63
End: 2023-03-13
Payer: COMMERCIAL

## 2023-03-28 ENCOUNTER — SPECIALTY PHARMACY (OUTPATIENT)
Dept: PHARMACY | Facility: HOSPITAL | Age: 63
End: 2023-03-28
Payer: COMMERCIAL

## 2023-03-28 RX ORDER — CLINDAMYCIN HYDROCHLORIDE 300 MG/1
300 CAPSULE ORAL 3 TIMES DAILY
COMMUNITY

## 2023-03-28 NOTE — PROGRESS NOTES
Specialty Pharmacy Patient Management Program  Refill Coordination - Pharmacist Escalation      Mariola Beck is a 62 y.o. female with Type 2 Diabetes seen by an Endocrinology provider and enrolled in the Endocrinology Patient Management program offered by [facility].      Spoke with patient regarding change in medications. Patient was started on clindamycin 300 mg TID for 10 days due to tooth abscess. Medication change was updated and reviewed. Not an issue for diabetes management at this time.     Felisa Mendez, PharmD, [facility]  Clinical Specialty Pharmacist, Endocrinology  3/28/2023  11:28 EDT

## 2023-04-04 ENCOUNTER — SPECIALTY PHARMACY (OUTPATIENT)
Dept: PHARMACY | Facility: HOSPITAL | Age: 63
End: 2023-04-04
Payer: COMMERCIAL

## 2023-04-26 ENCOUNTER — SPECIALTY PHARMACY (OUTPATIENT)
Dept: PHARMACY | Facility: HOSPITAL | Age: 63
End: 2023-04-26
Payer: COMMERCIAL

## 2023-05-24 ENCOUNTER — SPECIALTY PHARMACY (OUTPATIENT)
Dept: PHARMACY | Facility: HOSPITAL | Age: 63
End: 2023-05-24
Payer: COMMERCIAL

## 2023-05-30 ENCOUNTER — SPECIALTY PHARMACY (OUTPATIENT)
Dept: PHARMACY | Facility: HOSPITAL | Age: 63
End: 2023-05-30

## 2023-05-30 RX ORDER — CEFDINIR 300 MG/1
300 CAPSULE ORAL 2 TIMES DAILY
COMMUNITY

## 2023-05-30 NOTE — PROGRESS NOTES
Specialty Pharmacy Patient Management Program  Refill Coordination - Pharmacist Escalation      Mariola Beck is a 63 y.o. female with Type 2 Diabetes seen by an Endocrinology provider and enrolled in the Endocrinology Patient Management program offered by [facility].      Spoke with patient regarding change in medications. Patient was started on cefdinir 300 mg BID for strep throat. Medication change was updated and reviewed. No significant drug-drug interactions with diabetes medications according to literature. Not an issue for diabetes management at this time. Continue with normal follow-up.      Thank you,      Felisa Mendez, PharmD, [facility]  Clinical Specialty Pharmacist, Endocrinology  5/30/2023  09:18 EDT

## 2023-06-07 ENCOUNTER — SPECIALTY PHARMACY (OUTPATIENT)
Dept: PHARMACY | Facility: HOSPITAL | Age: 63
End: 2023-06-07
Payer: COMMERCIAL

## 2023-06-07 ENCOUNTER — OFFICE VISIT (OUTPATIENT)
Dept: ENDOCRINOLOGY | Facility: CLINIC | Age: 63
End: 2023-06-07
Payer: COMMERCIAL

## 2023-06-07 VITALS
HEART RATE: 97 BPM | BODY MASS INDEX: 39.65 KG/M2 | WEIGHT: 223.8 LBS | DIASTOLIC BLOOD PRESSURE: 62 MMHG | SYSTOLIC BLOOD PRESSURE: 118 MMHG | HEIGHT: 63 IN | OXYGEN SATURATION: 94 %

## 2023-06-07 DIAGNOSIS — E11.65 UNCONTROLLED TYPE 2 DIABETES MELLITUS WITH HYPERGLYCEMIA: Primary | ICD-10-CM

## 2023-06-07 LAB
EXPIRATION DATE: ABNORMAL
GLUCOSE BLDC GLUCOMTR-MCNC: 247 MG/DL (ref 70–130)
HBA1C MFR BLD: 9.5 %
Lab: ABNORMAL

## 2023-06-07 RX ORDER — TIRZEPATIDE 10 MG/.5ML
10 INJECTION, SOLUTION SUBCUTANEOUS WEEKLY
Qty: 2 ML | Refills: 5 | Status: SHIPPED | OUTPATIENT
Start: 2023-06-07

## 2023-06-07 RX ORDER — AMLODIPINE BESYLATE 2.5 MG/1
TABLET ORAL
COMMUNITY

## 2023-06-07 RX ORDER — INSULIN LISPRO 100 [IU]/ML
INJECTION, SOLUTION INTRAVENOUS; SUBCUTANEOUS
Qty: 60 ML | Refills: 5 | Status: SHIPPED | OUTPATIENT
Start: 2023-06-07

## 2023-06-07 RX ORDER — PROCHLORPERAZINE 25 MG/1
1 SUPPOSITORY RECTAL
Qty: 1 EACH | Refills: 1 | Status: SHIPPED | OUTPATIENT
Start: 2023-06-07

## 2023-06-07 RX ORDER — PROCHLORPERAZINE 25 MG/1
SUPPOSITORY RECTAL
Qty: 3 EACH | Refills: 5 | Status: SHIPPED | OUTPATIENT
Start: 2023-06-07

## 2023-06-07 RX ORDER — INSULIN PMP CART,AUT,G6/7,CNTR
1 EACH SUBCUTANEOUS DAILY
Qty: 30 EACH | Refills: 5 | Status: SHIPPED | OUTPATIENT
Start: 2023-06-07

## 2023-06-07 NOTE — PROGRESS NOTES
"Chief Complaint   Patient presents with    Diabetes        Mariola Beck is a 63 y.o. female had concerns including Diabetes.    T2DM.    She is checking blood sugars frequently with Dexcom CGM.  Current medications for diabetes include Trulicity 4.5 mg weekly-she has missed a dose or 2 d/t not being able to get it from the pharmacy, insulin via an Omnipod 5 insulin pump, Metformin 1,000 mg BID. She is asking about switching Mounjaro.  She is on statin therapy.  Hypos: rarely    Is getting injections in the left eye for diabetic retinopathy.  She has a history of UTI and yeast infection.  She has not been following a diet/exercise program.  She just recently started her insulin pump.    The following portions of the patient's history were reviewed and updated as appropriate: allergies, current medications, past family history, past medical history, past social history, past surgical history and problem list.      Review of Systems   Constitutional:  Positive for fatigue.   Respiratory:  Positive for shortness of breath.    Endocrine: Positive for polydipsia. Negative for polyuria.   All other systems reviewed and are negative.     Physical Exam  Vitals reviewed.   Constitutional:       Appearance: Normal appearance.   Cardiovascular:      Rate and Rhythm: Normal rate.   Pulmonary:      Effort: Pulmonary effort is normal.   Skin:     General: Skin is warm.   Neurological:      General: No focal deficit present.      Mental Status: She is alert and oriented to person, place, and time.   Psychiatric:         Mood and Affect: Mood normal.         Behavior: Behavior normal.         Thought Content: Thought content normal.         Judgment: Judgment normal.      /62 (BP Location: Left arm, Patient Position: Sitting, Cuff Size: Adult)   Pulse 97   Ht 160 cm (63\")   Wt 102 kg (223 lb 12.8 oz)   LMP  (LMP Unknown)   SpO2 94%   BMI 39.64 kg/m²      Labs and imaging    CMP:     Lipid Panel:  No results found for: " CHOL, TRIG, HDL, VLDL, LDL  HbA1c:  Lab Results   Component Value Date    HGBA1C 9.5 (A) 06/07/2023    HGBA1C 9.1 02/16/2023     Glucose:      Lab Results   Component Value Date    POCGLU 247 (A) 06/07/2023     Microalbumin:  No results found for: MALBCRERATIO  TSH:  No results found for: TSH    Assessment and plan  Diagnoses and all orders for this visit:    1. Uncontrolled type 2 diabetes mellitus with hyperglycemia (Primary)  Assessment & Plan:  -Diabetes is worsening with A1C up from 9.1 to 9.5.  -Switch to Mounjaro 10 mg weekly. Patient has no personal history of pancreatitis, no family history of MEN syndrome or medullary thyroid cancer. Possible side effects including nausea, bloating, other GI upset and rarely pancreatitis were discussed. She was advised to call the office with any symptoms or concerns.   -2 week data download of insulin pump shows that her glucoses are overall hyper.  CGM as below:  Very High: 66%  High: 25%  In Range: 9%  Low: 0%  Very Low: 0%  Adjustment to pump settings as below:  Basal: 2.6 u/hr  CR: 1:3  -Continue Metformin 1,000 mg BID.  -S/S hypoglycemia discussed with Rule of 15's advised.  -Continue checking BG regularly. Continue using Dexcom CGM.  -Will reassess in 4 months.    Orders:  -     POC Glucose Fingerstick  -     POC Glycosylated Hemoglobin (Hb A1C)         Return in about 3 months (around 9/7/2023) for Follow-up appointment, A1C. The patient was instructed to contact the clinic with any interval questions or concerns.    This document has been electronically signed by KHURRAM Maldonado  June 9, 2023 11:43 EDT  Endocrinology

## 2023-06-07 NOTE — PROGRESS NOTES
Specialty Pharmacy Patient Management Program  Endocrinology Reassessment     Mariola Beck is a 63 y.o. female with Type 2 Diabetes seen by an Endocrinology provider and enrolled in the Endocrinology Patient Management program offered by [facility].  A follow-up outreach was conducted, including assessment of continued therapy appropriateness, medication adherence, and side effect incidence and management for Trulicity, Humalog, Omnipod, and Metformin.     Patient is currently taking Trulicity 4.5 mg weekly, Metformin 1,000 mg twice daily, and using Humalog in her Omnipod 5 insulin pump. She uses Dexcom to monitor BG with readings >200s. She denies low BG < 70 mg/dL. She reports using more insulin and needing to change her OmniPods almost every day. She would like to try Mounjaro.     Patient denies personal/family history of thyroid cancer and denies issues with pancreas/pancreatitis.     Changes to Insurance Coverage or Financial Support  None    Relevant Past Medical History and Comorbidities  Relevant medical history and concomitant health conditions were discussed with the patient. The patient's chart has been reviewed for relevant past medical history and comorbid health conditions and updated as necessary.   Past Medical History:   Diagnosis Date    Asthma     Chronic headaches     COPD (chronic obstructive pulmonary disease)     H/O bladder infections     Heart attack     Heart disease     Hypertension     Type 2 diabetes mellitus      Social History     Socioeconomic History    Marital status:    Tobacco Use    Smoking status: Former     Packs/day: 1.00     Types: Cigarettes    Smokeless tobacco: Never   Vaping Use    Vaping Use: Never used   Substance and Sexual Activity    Alcohol use: Not Currently    Drug use: Never    Sexual activity: Defer       Problem list reviewed by Felisa Mendez RPH on 6/7/2023 at  2:20 PM    Allergies  Known allergies and reactions were  discussed with the patient. The patient's chart has been reviewed for allergy information and updated as necessary.   Plavix [clopidogrel]    Allergies reviewed by Felisa Mendez [facility] on 6/7/2023 at  2:20 PM    Relevant Laboratory Values  A1C Last 3 Results          11/14/2022    09:26 2/16/2023    09:25 6/7/2023    14:18   HGBA1C Last 3 Results   Hemoglobin A1C 9.3  9.1  9.5      Lab Results   Component Value Date    HGBA1C 9.5 (A) 06/07/2023     No results found for: GLUCOSE, CALCIUM, NA, K, CO2, CL, BUN, CREATININE, EGFRIFAFRI, EGFRIFNONA, BCR, ANIONGAP  No results found for: CHOL, CHLPL, TRIG, HDL, LDL, LDLDIRECT      Current Medication List  This medication list has been reviewed with the patient and evaluated for any interactions or necessary modifications/recommendations, and updated to include all prescription medications, OTC medications, and supplements the patient is currently taking.  This list reflects what is contained in the patient's profile, which has also been marked as reviewed to communicate to other providers it is the most up to date version of the patient's current medication therapy.     Current Outpatient Medications:     amLODIPine (NORVASC) 2.5 MG tablet, TAKE 1 TABLET BY MOUTH 1 TIME EACH DAY., Disp: , Rfl:     aspirin 81 MG EC tablet, Take 1 tablet by mouth Daily., Disp: , Rfl:     B-D ULTRAFINE III SHORT PEN 31G X 8 MM misc, See Admin Instructions., Disp: , Rfl:     Calcium Carbonate (CALCIUM 600 PO), Take 600 mg by mouth Daily., Disp: , Rfl:     coenzyme Q10 100 MG capsule, Take 1 capsule by mouth Daily., Disp: , Rfl:     Continuous Blood Gluc  (Dexcom G6 ) device, 1 each Continuous., Disp: 1 each, Rfl: 0    Continuous Blood Gluc Sensor (Dexcom G6 Sensor), Change sensor every 10 (Ten) Days., Disp: 3 each, Rfl: 5    Continuous Blood Gluc Transmit (Dexcom G6 Transmitter) misc, Change transmitter Every 3 (Three) Months., Disp: 1 each, Rfl: 1    Cyanocobalamin (B-12) 1000  "MCG sublingual tablet, Take 1 tablet by mouth Daily., Disp: , Rfl:     Dulaglutide (Trulicity) 4.5 MG/0.5ML solution pen-injector, Inject 0.5 mL under the skin into the appropriate area as directed 1 (One) Time Per Week., Disp: 2 mL, Rfl: 5    Insulin Disposable Pump (Omnipod 5 G6 Pod, Gen 5,) misc, 1 each Every Other Day. Change every other day, Disp: 45 each, Rfl: 1    Insulin Lispro (HumaLOG) 100 UNIT/ML injection, For use in insulin pump.  Maximum Daily Dose: 150 units, Disp: 50 mL, Rfl: 5    Insulin Syringe 31G X 5/16\" 1 ML misc, To administer insulin BID., Disp: 100 each, Rfl: 2    Magnesium 300 MG capsule, Take 300 mg by mouth 2 (Two) Times a Day., Disp: , Rfl:     metFORMIN (GLUCOPHAGE) 1000 MG tablet, Take 1 tablet by mouth 2 (Two) Times a Day., Disp: 60 tablet, Rfl: 5    metFORMIN (GLUCOPHAGE) 1000 MG tablet, Take 1 tablet by mouth 2 (Two) Times a Day., Disp: 60 tablet, Rfl: 5    metoprolol tartrate (LOPRESSOR) 50 MG tablet, Take 25 mg by mouth 2 (Two) Times a Day., Disp: , Rfl:     montelukast (SINGULAIR) 10 MG tablet, Take 1 tablet by mouth Daily., Disp: , Rfl:     Multiple Vitamins-Minerals (ICAPS AREDS 2 PO), Take 2 capsules by mouth 2 (Two) Times a Day., Disp: , Rfl:     nitroglycerin (NITROSTAT) 0.4 MG SL tablet, DISSOLVE 1 TABLET UNDER THE TONGUE AS NEEDED, Disp: , Rfl:     Omega-3 Fatty Acids (Odorless Coated Fish Oil) 1000 MG capsule delayed-release, 2 (two) times a day., Disp: , Rfl:     pantoprazole (PROTONIX) 40 MG EC tablet, Take 1 tablet by mouth Daily., Disp: , Rfl:     prasugrel (EFFIENT) 10 MG tablet, Take 1 tablet by mouth Daily., Disp: , Rfl:     rOPINIRole (REQUIP) 0.25 MG tablet, Take 1 tablet by mouth Daily., Disp: , Rfl:     rosuvastatin (CRESTOR) 20 MG tablet, Take 1 tablet by mouth Every Evening., Disp: , Rfl:     vitamin D3 125 MCG (5000 UT) capsule capsule, Take 1 capsule by mouth Daily., Disp: , Rfl:     insulin detemir (Levemir FlexTouch) 100 UNIT/ML injection, Inject 60 Units " under the skin into the appropriate area as directed 2 (Two) Times a Day. (Patient not taking: Reported on 6/7/2023), Disp: 45 mL, Rfl: 2    insulin detemir (Levemir) 100 UNIT/ML injection, Inject 60 Units under the skin into the appropriate area as directed 2 (Two) Times a Day. (Patient not taking: Reported on 6/7/2023), Disp: 40 mL, Rfl: 2    Medicines reviewed by Felisa Mendez [facility] on 6/7/2023 at  2:22 PM    Drug Interactions  The hypoglycemic effect of Insulin may be increased or prolonged by metoprolol. Minor cardiovascular abnormalities may occur during hypoglycemic episodes.    Recommended Medications Assessment  Aspirin - Currently Taking  Statin - Currently Taking  ACEi/ARB - Not Indicated    Current 10-Year ASCVD Risk: need updated labs to calculate    Adverse Drug Reactions  Adverse Reactions Experienced: None   Plan for ADR Management: Not Required    Hospitalizations and Urgent Care Since Last Assessment  Hospitalizations or Admissions: None  ED Visits: None   Urgent Office Visits: None    Adherence and Self-Administration  Adherence related patient's specialty therapy was discussed with the patient. The Adherence segment of this outreach has been reviewed and updated.     Ongoing or New Barriers to Patient Adherence and/or Self-Administration: None  Methods for Supporting Patient Adherence and/or Self-Administration: None Required    Goals of Therapy  Goals related to the patient's specialty therapy was discussed with the patient. The Patient Goals segment of this outreach has been reviewed and updated.    Goals        Consistently take medications as prescribed      HEMOGLOBIN A1C < 7            Quality of Life Assessment  Quality of Life Improvement Scale: Moderately better    Reassessment Plan & Follow-Up  Patient's diabetes remains unchanged with A1C of 9.5%.  Medication Therapy Changes:   Continue Dexcom and Omnipod  Continue Metformin 1,000 mg BID   Discontinue Trulicity and start Mounjaro 10 mg  weekly per pt's request  Additional Plans, Therapy Recommendations, or Therapy Problems to Be Addressed:   Updated labs would be beneficial to calculate ASCVD risk score.  Will send updated prescriptions to apothecary and coordinate medication refills.   Pharmacist to perform regular reassessments no more than (6) months from the previous assessment.  Care Coordinator to set up future refill outreaches, coordinate prescription delivery, and escalate clinical questions to pharmacist.      Attestation  I attest that the specialty medication(s) addressed above are appropriate for the patient based on my reassessment.  If the prescribed therapy is at any point deemed not appropriate based on the current or future assessments, a consultation will be initiated with the patient's specialty care provider to determine the best course of action. The revised plan of therapy will be documented along with any additional patient education provided.     Felisa Mendez, PharmD, [facility]  Clinical Specialty Pharmacist, Endocrinology   6/7/2023  14:23 EDT

## 2023-06-08 NOTE — PROGRESS NOTES
Mounjaro is covered on patient's insurance with $0 co-pay. Called patient and provided education over the phone. Pt was agreeable to starting therapy with Mounjaro once she uses her last Trulicity injection. Pt verbalized understanding and didn't have any further questions.     Education Provided for DM medications:  The patient has been provided with the following education and any applicable administration techniques (i.e. self-injection) have been demonstrated for the therapies indicated. All questions and concerns have been addressed prior to the patient receiving the medication, and the patient has verbalized understanding of the education and any materials provided.  Additional patient education shall be provided and documented upon request by the patient, provider or payer.       Mounjaro® (tirzepatide)   How long will I be on this medication for?  The amount of time you will be on this medication will be determined by your doctor based on blood sugar and A1c control. You will most likely be on this medication or another diabetes medication throughout your lifetime. Do not abruptly stop this medication without talking to your doctor first.     How do I take this medication?  Take as directed on your prescription label. Mounjaro is supplied in a single-use pen for each dose and you will use a new pre-filled pen each week.  It is injected under the skin (subcutaneously) of your stomach, thigh or upper arm.  You may inject in the same body area each week, but make sure to use a different spot each time.  Use this medication once weekly, on the same day each week, with or without food.        First, choose your injection site and clean the area, allowing it to dry completely.  Make sure the pen is in the locked position and remove the cap by pulling it straight off.     Turn the pen to the unlocked position and place the clear base firmly against your skin at your injection site.  Press and hold the button on the  end of the pen; you will hear a click once the injection starts.     You will hear a second click when the needle starts retracting.  The injection will take about 5-10 seconds.     Continue pressing against your skin until the gray plunger is visible, and then you will slowly lift the pen from your skin and dispose of the pen (detailed below in “Medication Storage / Handling”).     What are some possible side effects?  You may notice you don't feel as hungry, especially when you first start using Mounjaro.  In addition to decreased appetite, the most common side effects are nausea, diarrhea, vomiting, stomach pain, indigestion, and fatigue.  Redness, itching, and/or swelling can occur where the shot was given. You should also monitor for low blood sugar (hypoglycemia), especially if you are taking Mounjaro with other medications that cause low blood sugar.     What happens if I miss a dose?  If you miss a dose, take it as soon as you remember as long as there are at least 3 days until the next scheduled dose.  If there are less than 3 days, skip the missed dose and resume  Mounjaro on the regularly scheduled day.  Do not take 2 doses at the same time or extra doses.      Medication Safety    What are things I should warn my doctor immediately about?  Do not use Mounjaro if you or a family member have ever had medullary thyroid cancer (MTC) or Multiple Endocrine Neoplasia syndrome type 2 (MEN 2).     Tell your doctor if you get a lump or swelling in your neck, hoarseness, difficulty swallowing, or feel short of breath (these may be symptoms of thyroid cancer).      Tell your doctor if you have or have had problems with your kidneys or pancreas.    Stop using Mounjaro and get medical help right away if you have severe pain in your stomach area that will not go away as this could be a sign of pancreatitis (inflammation of your pancreas)      Tell your doctor if you have problem digesting food or slowed emptying of  your stomach (gastroparesis).        Let your doctor know if you have changes in vision while taking Mounjaro or have been diagnosed with diabetic retinopathy.      Talk to your doctor if you are pregnant, planning to become pregnant, or breastfeeding.       Get medical help right away if you notice any signs/symptoms of an allergic reaction (rash, hives, difficulty breathing, etc.).    What are things that I should be cautious of?  Be cautious of any side effects from this medication. Talk to your doctor if any new ones develop or aren't getting better.    What are some medications that can interact with this one?  Taking Mounjaro with other medications that also lower your blood sugar such as insulin and glipizide/glimepiride/glyburide may increase the risk of low blood sugar.   Your doctor may reduce the dose of these medications when you start Mounjaro to minimize low blood sugars      It should not be taken with other medicines called GLP-1 receptor agonists, because these work the same way as Mounjaro.        Because Mounjaro slows stomach emptying, it can affect the way some medicines work.      Always tell your doctor or pharmacist immediately if you start taking any new medications, including over-the-counter medications, vitamins, and herbal supplements.        Medication Storage/Handling    How should I handle this medication?  Keep this medication out of reach of pets/children and keep the pen capped when not in use.  Do not share your medicine pens with others.    How does this medication need to be stored?  Store Mounjaro in the refrigerator [2°C to 8°C (36°F to 46°F)]; do not freeze and do not use if Mounjaro has been frozen.  You may store your Mounjaro pens at room temperature [8°C to 30°C (46°F to 86°F)] for up to 21 days.  Protect from excessive heat and sunlight.  Keep in the original carton until time of administration.    How should I dispose of this medication?  Used Mounjaro pens should  discarded after each use (for single use only). Place your used Mounjaro pen in an approved sharps container after use.  If you do not have a sharps container, you may use a household container made of heavy-duty plastic with a tight-fitting lid that is leak resistant (e.g., heavy-duty plastic laundry detergent bottle).        If your doctor decides to stop this medication, take to your local police station for proper disposal. Some pharmacies also have take-back bins for medication drop-off.       Resources/Support    How can I remind myself to take this medication?  You can download reminder apps to help you manage your refills. You may also set an alarm on your phone to remind you.     Is financial support available?   [employer] can provide co-pay cards if you have commercial insurance or patient assistance if you have Medicare or no insurance.     Which vaccines are recommended for me?  Talk to your doctor about these vaccines:   Flu    Coronavirus (COVID-19)    Pneumococcal (pneumonia)    Tdap    Hepatitis B    Zoster (shingles)        Felisa Mendez, PharmD, [facility]  Clinical Specialty Pharmacist, Endocrinology  06/08/23  13:31 EDT

## 2023-08-31 ENCOUNTER — SPECIALTY PHARMACY (OUTPATIENT)
Dept: PHARMACY | Facility: HOSPITAL | Age: 63
End: 2023-08-31
Payer: COMMERCIAL

## 2023-09-07 ENCOUNTER — SPECIALTY PHARMACY (OUTPATIENT)
Dept: PHARMACY | Facility: HOSPITAL | Age: 63
End: 2023-09-07
Payer: COMMERCIAL

## 2023-09-14 ENCOUNTER — OFFICE VISIT (OUTPATIENT)
Dept: ENDOCRINOLOGY | Facility: CLINIC | Age: 63
End: 2023-09-14
Payer: COMMERCIAL

## 2023-09-14 ENCOUNTER — SPECIALTY PHARMACY (OUTPATIENT)
Dept: PHARMACY | Facility: HOSPITAL | Age: 63
End: 2023-09-14
Payer: COMMERCIAL

## 2023-09-14 VITALS
WEIGHT: 230.6 LBS | OXYGEN SATURATION: 96 % | HEIGHT: 63 IN | DIASTOLIC BLOOD PRESSURE: 94 MMHG | BODY MASS INDEX: 40.86 KG/M2 | HEART RATE: 76 BPM | SYSTOLIC BLOOD PRESSURE: 108 MMHG

## 2023-09-14 DIAGNOSIS — E11.65 UNCONTROLLED TYPE 2 DIABETES MELLITUS WITH HYPERGLYCEMIA: ICD-10-CM

## 2023-09-14 DIAGNOSIS — E11.65 UNCONTROLLED TYPE 2 DIABETES MELLITUS WITH HYPERGLYCEMIA: Primary | ICD-10-CM

## 2023-09-14 PROCEDURE — 99214 OFFICE O/P EST MOD 30 MIN: CPT | Performed by: NURSE PRACTITIONER

## 2023-09-14 PROCEDURE — 95251 CONT GLUC MNTR ANALYSIS I&R: CPT | Performed by: NURSE PRACTITIONER

## 2023-09-14 RX ORDER — TIRZEPATIDE 10 MG/.5ML
10 INJECTION, SOLUTION SUBCUTANEOUS WEEKLY
Qty: 2 ML | Refills: 5 | Status: SHIPPED | OUTPATIENT
Start: 2023-09-14

## 2023-09-14 RX ORDER — CALCIUM POLYCARBOPHIL 625 MG
TABLET ORAL DAILY
COMMUNITY

## 2023-09-14 RX ORDER — PROCHLORPERAZINE 25 MG/1
SUPPOSITORY RECTAL
Qty: 3 EACH | Refills: 5 | Status: SHIPPED | OUTPATIENT
Start: 2023-09-14

## 2023-09-14 RX ORDER — INSULIN LISPRO 100 [IU]/ML
INJECTION, SOLUTION INTRAVENOUS; SUBCUTANEOUS
Qty: 60 ML | Refills: 5 | Status: SHIPPED | OUTPATIENT
Start: 2023-09-14

## 2023-09-14 RX ORDER — PROCHLORPERAZINE 25 MG/1
1 SUPPOSITORY RECTAL
Qty: 1 EACH | Refills: 1 | Status: SHIPPED | OUTPATIENT
Start: 2023-09-14

## 2023-09-14 RX ORDER — INSULIN PMP CART,AUT,G6/7,CNTR
1 EACH SUBCUTANEOUS DAILY
Qty: 30 EACH | Refills: 5 | Status: SHIPPED | OUTPATIENT
Start: 2023-09-14

## 2023-09-14 NOTE — PROGRESS NOTES
Specialty Pharmacy Patient Management Program  Endocrinology Reassessment     Mariola Beck is a 63 y.o. female with Type 2 Diabetes seen by an Endocrinology provider and enrolled in the Endocrinology Patient Management program offered by [facility].  A follow-up outreach was conducted, including assessment of continued therapy appropriateness, medication adherence, and side effect incidence and management for Mounjaro, Humalog, Omnipod, and Metformin.     Patient is currently taking Mounjaro 10 mg weekly, Metformin 1,000 mg twice daily, and using Humalog in her Omnipod 5 insulin pump. She uses Dexcom to monitor BG. She denies low BG < 70 mg/dL. She reports improvements in her BG since starting on OmniPod 5 insulin pump.     Patient denies personal/family history of thyroid cancer and denies issues with pancreas/pancreatitis.     Changes to Insurance Coverage or Financial Support  None    Relevant Past Medical History and Comorbidities  Relevant medical history and concomitant health conditions were discussed with the patient. The patient's chart has been reviewed for relevant past medical history and comorbid health conditions and updated as necessary.   Past Medical History:   Diagnosis Date    Asthma     Chronic headaches     COPD (chronic obstructive pulmonary disease)     H/O bladder infections     Heart attack     Heart disease     Hypertension     Type 2 diabetes mellitus      Social History     Socioeconomic History    Marital status:    Tobacco Use    Smoking status: Former     Packs/day: 1.00     Types: Cigarettes    Smokeless tobacco: Never   Vaping Use    Vaping Use: Never used   Substance and Sexual Activity    Alcohol use: Not Currently    Drug use: Never    Sexual activity: Defer       Problem list reviewed by Felisa Mendez RPH on 9/14/2023 at  2:20 PM    Allergies  Known allergies and reactions were discussed with the patient. The patient's chart has been reviewed  for allergy information and updated as necessary.   Plavix [clopidogrel]    Allergies reviewed by Felisa Mendez RP on 9/14/2023 at  2:20 PM    Relevant Laboratory Values  A1C Last 3 Results          11/14/2022    09:26 2/16/2023    09:25 6/7/2023    14:18   HGBA1C Last 3 Results   Hemoglobin A1C 9.3  9.1  9.5      Lab Results   Component Value Date    HGBA1C 9.5 (A) 06/07/2023     No results found for: GLUCOSE, CALCIUM, NA, K, CO2, CL, BUN, CREATININE, EGFRIFAFRI, EGFRIFNONA, BCR, ANIONGAP  No results found for: CHOL, CHLPL, TRIG, HDL, LDL, LDLDIRECT      Current Medication List  This medication list has been reviewed with the patient and evaluated for any interactions or necessary modifications/recommendations, and updated to include all prescription medications, OTC medications, and supplements the patient is currently taking.  This list reflects what is contained in the patient's profile, which has also been marked as reviewed to communicate to other providers it is the most up to date version of the patient's current medication therapy.     Current Outpatient Medications:     amLODIPine (NORVASC) 2.5 MG tablet, TAKE 1 TABLET BY MOUTH 1 TIME EACH DAY., Disp: , Rfl:     aspirin 81 MG EC tablet, Take 1 tablet by mouth Daily., Disp: , Rfl:     Calcium Carbonate (CALCIUM 600 PO), Take 600 mg by mouth Daily., Disp: , Rfl:     coenzyme Q10 100 MG capsule, Take 1 capsule by mouth Daily., Disp: , Rfl:     Continuous Blood Gluc  (Dexcom G6 ) device, 1 each Continuous., Disp: 1 each, Rfl: 0    Continuous Blood Gluc Sensor (Dexcom G6 Sensor), Change sensor every 10 (Ten) Days., Disp: 3 each, Rfl: 5    Continuous Blood Gluc Transmit (Dexcom G6 Transmitter) misc, Change transmitter Every 3 (Three) Months., Disp: 1 each, Rfl: 1    Cyanocobalamin (B-12) 1000 MCG sublingual tablet, Take 1 tablet by mouth Daily., Disp: , Rfl:     Insulin Disposable Pump (Omnipod 5 G6 Pod, Gen 5,) misc, Change every day as  "directed., Disp: 30 each, Rfl: 5    Insulin Lispro (HumaLOG) 100 UNIT/ML injection, For use in insulin pump.  Maximum Daily Dose: 200 units, Disp: 60 mL, Rfl: 5    Insulin Pen Needle (Pen Needles) 32G X 4 MM misc, Use to inject insulin up to 4 times daily as directed, Disp: 100 each, Rfl: 5    Insulin Syringe 31G X 5/16\" 1 ML misc, To administer insulin BID., Disp: 100 each, Rfl: 2    Magnesium 300 MG capsule, Take 300 mg by mouth 2 (Two) Times a Day., Disp: , Rfl:     metFORMIN (GLUCOPHAGE) 1000 MG tablet, Take 1 tablet by mouth 2 (Two) Times a Day., Disp: 60 tablet, Rfl: 5    metoprolol tartrate (LOPRESSOR) 50 MG tablet, Take 0.5 tablets by mouth 2 (Two) Times a Day., Disp: , Rfl:     montelukast (SINGULAIR) 10 MG tablet, Take 1 tablet by mouth Daily., Disp: , Rfl:     Multiple Vitamins-Minerals (ICAPS AREDS 2 PO), Take 2 capsules by mouth 2 (Two) Times a Day., Disp: , Rfl:     nitroglycerin (NITROSTAT) 0.4 MG SL tablet, DISSOLVE 1 TABLET UNDER THE TONGUE AS NEEDED, Disp: , Rfl:     Omega-3 Fatty Acids (Odorless Coated Fish Oil) 1000 MG capsule delayed-release, 2 (two) times a day., Disp: , Rfl:     pantoprazole (PROTONIX) 40 MG EC tablet, Take 1 tablet by mouth Daily., Disp: , Rfl:     polycarbophil 625 MG tablet tablet, Take  by mouth Daily., Disp: , Rfl:     prasugrel (EFFIENT) 10 MG tablet, Take 1 tablet by mouth Daily., Disp: , Rfl:     rOPINIRole (REQUIP) 0.25 MG tablet, Take 1 tablet by mouth Daily., Disp: , Rfl:     rosuvastatin (CRESTOR) 20 MG tablet, Take 1 tablet by mouth Every Evening., Disp: , Rfl:     Tirzepatide (Mounjaro) 10 MG/0.5ML solution pen-injector, Inject 0.5 mL under the skin 1 (One) Time Per Week., Disp: 2 mL, Rfl: 5    vitamin D3 125 MCG (5000 UT) capsule capsule, Take 1 capsule by mouth Daily., Disp: , Rfl:     vitamin E 100 UNIT capsule, Take 1 capsule by mouth Daily., Disp: , Rfl:     insulin detemir (Levemir FlexPen) 100 UNIT/ML injection, Inject 60 Units under the skin into the " appropriate area as directed Daily. To have on hand in case of insulin pump failure (Patient not taking: Reported on 9/14/2023), Disp: 15 mL, Rfl: 5    Insulin Lispro, 1 Unit Dial, (HumaLOG KwikPen) 100 UNIT/ML solution pen-injector, Inject 30 Units under the skin into the appropriate area as directed 3 (Three) Times a Day With Meals. To have on hand in case of insulin pump failure (Patient not taking: Reported on 9/14/2023), Disp: 15 mL, Rfl: 5    Medicines reviewed by Felisa Mendez [facility] on 9/14/2023 at  2:22 PM    Drug Interactions  The hypoglycemic effect of Insulin may be increased or prolonged by metoprolol. Minor cardiovascular abnormalities may occur during hypoglycemic episodes.    Recommended Medications Assessment  Aspirin - Currently Taking  Statin - Currently Taking  ACEi/ARB - Not Indicated    Current 10-Year ASCVD Risk: need updated labs to calculate    Adverse Drug Reactions  Adverse Reactions Experienced: None   Plan for ADR Management: Not Required    Hospitalizations and Urgent Care Since Last Assessment  Hospitalizations or Admissions: None  ED Visits: None   Urgent Office Visits: None    Adherence and Self-Administration  Adherence related patient's specialty therapy was discussed with the patient. The Adherence segment of this outreach has been reviewed and updated.     Ongoing or New Barriers to Patient Adherence and/or Self-Administration: None  Methods for Supporting Patient Adherence and/or Self-Administration: None Required    Goals of Therapy  Goals related to the patient's specialty therapy was discussed with the patient. The Patient Goals segment of this outreach has been reviewed and updated.    Goals Addressed Today        Consistently take medications as prescribed      Specialty Pharmacy General Goal      Prevent hypoglycemia             Quality of Life Assessment  Quality of Life Improvement Scale: A good deal better    Reassessment Plan & Follow-Up  Patient's diabetes has improved  since last visit. Her A1C was drawn this morning by her PCP.   Medication Therapy Changes:   Continue Dexcom and Omnipod  Continue Metformin 1,000 mg BID   Continue Mounjaro 10 mg weekly   Continue Humalog in insulin pump  Additional Plans, Therapy Recommendations, or Therapy Problems to Be Addressed:   Will send updated prescriptions to apothecary and coordinate medication refills.   Pharmacist to perform regular reassessments no more than (6) months from the previous assessment.  Care Coordinator to set up future refill outreaches, coordinate prescription delivery, and escalate clinical questions to pharmacist.      Attestation  I attest that the specialty medication(s) addressed above are appropriate for the patient based on my reassessment.  If the prescribed therapy is at any point deemed not appropriate based on the current or future assessments, a consultation will be initiated with the patient's specialty care provider to determine the best course of action. The revised plan of therapy will be documented along with any additional patient education provided.     Felisa Mendez, PharmD, [facility]CK[facility]OLMAN  Clinical Specialty Pharmacist, Endocrinology   9/14/2023  14:55 EDT

## 2023-09-14 NOTE — PROGRESS NOTES
"Chief Complaint   Patient presents with    Diabetes        Mariola Beck is a 63 y.o. female had concerns including Diabetes.    T2DM.    She is checking blood sugars frequently with Dexcom CGM.  Current medications for diabetes include Mounjaro 10 mg weekly, insulin via an Omnipod 5 insulin pump, Metformin 1,000 mg BID.   She is on statin therapy.  Hypos: rarely    Is getting injections in the left eye for diabetic retinopathy.  She has a history of UTI and yeast infection.  She has not been following a diet/exercise program.  She just recently started her insulin pump. She is doing well with this.    The following portions of the patient's history were reviewed and updated as appropriate: allergies, current medications, past family history, past medical history, past social history, past surgical history and problem list.      Review of Systems   Constitutional:  Positive for fatigue.   Respiratory:  Positive for shortness of breath.    Endocrine: Positive for polydipsia. Negative for polyuria.   All other systems reviewed and are negative.     Physical Exam  Vitals reviewed.   Constitutional:       Appearance: Normal appearance.   Cardiovascular:      Rate and Rhythm: Normal rate.   Pulmonary:      Effort: Pulmonary effort is normal.   Skin:     General: Skin is warm.   Neurological:      General: No focal deficit present.      Mental Status: She is alert and oriented to person, place, and time.   Psychiatric:         Mood and Affect: Mood normal.         Behavior: Behavior normal.         Thought Content: Thought content normal.         Judgment: Judgment normal.      /94 (BP Location: Left arm, Patient Position: Sitting, Cuff Size: Adult)   Pulse 76   Ht 160 cm (63\")   Wt 105 kg (230 lb 9.6 oz)   LMP  (LMP Unknown)   SpO2 96%   BMI 40.85 kg/m²      Labs and imaging    CMP:     Lipid Panel:  No results found for: CHOL, TRIG, HDL, VLDL, LDL  HbA1c:  Lab Results   Component Value Date    HGBA1C 9.5 " (A) 06/07/2023    HGBA1C 9.1 02/16/2023     Glucose:      Lab Results   Component Value Date    POCGLU 247 (A) 06/07/2023     Microalbumin:  No results found for: MALBCRERATIO  TSH:  No results found for: TSH    Assessment and plan  Diagnoses and all orders for this visit:    1. Uncontrolled type 2 diabetes mellitus with hyperglycemia (Primary)  Assessment & Plan:  -Diabetes is improving with improving BG's.  She had her A1c done this morning at PCP and we do not have the results.  -Continue Mounjaro 10 mg weekly. Patient has no personal history of pancreatitis, no family history of MEN syndrome or medullary thyroid cancer. Possible side effects including nausea, bloating, other GI upset and rarely pancreatitis were discussed. She was advised to call the office with any symptoms or concerns.   -2 week data download of insulin pump shows that her glucoses are overall hyper.  CGM as below:  Very High: 1%  High: 19%  In Range: 80%  Low: 0%  Very Low: 0%  Trend shows overall regulated BG's with some meal time hypers.  Continue with current pump settings as below:  Basal: 2.6 u/hr  CR: 1:3  ISF: 15  -Continue Metformin 1,000 mg BID.  -S/S hypoglycemia discussed with Rule of 15's advised.  -Continue checking BG regularly. Continue using Dexcom CGM.  -Will reassess in 3 months.             Return in about 3 months (around 12/14/2023) for Follow-up appointment, A1C. The patient was instructed to contact the clinic with any interval questions or concerns.    This document has been electronically signed by KHURRAM Maldonado  September 14, 2023 14:40 EDT  Endocrinology

## 2023-09-14 NOTE — ASSESSMENT & PLAN NOTE
-Diabetes is improving with improving BG's.  She had her A1c done this morning at PCP and we do not have the results.  -Continue Mounjaro 10 mg weekly. Patient has no personal history of pancreatitis, no family history of MEN syndrome or medullary thyroid cancer. Possible side effects including nausea, bloating, other GI upset and rarely pancreatitis were discussed. She was advised to call the office with any symptoms or concerns.   -2 week data download of insulin pump shows that her glucoses are overall hyper.  CGM as below:  Very High: 1%  High: 19%  In Range: 80%  Low: 0%  Very Low: 0%  Trend shows overall regulated BG's with some meal time hypers.  Continue with current pump settings as below:  Basal: 2.6 u/hr  CR: 1:3  ISF: 15  -Continue Metformin 1,000 mg BID.  -S/S hypoglycemia discussed with Rule of 15's advised.  -Continue checking BG regularly. Continue using Dexcom CGM.  -Will reassess in 3 months.

## 2023-10-02 ENCOUNTER — SPECIALTY PHARMACY (OUTPATIENT)
Dept: PHARMACY | Facility: HOSPITAL | Age: 63
End: 2023-10-02
Payer: COMMERCIAL

## 2023-10-10 ENCOUNTER — SPECIALTY PHARMACY (OUTPATIENT)
Dept: PHARMACY | Facility: HOSPITAL | Age: 63
End: 2023-10-10
Payer: COMMERCIAL

## 2023-10-10 NOTE — PROGRESS NOTES
Specialty Pharmacy Refill Coordination Note     Mariola is a 63 y.o. female contacted today regarding refills of  metformin, mounjaro, omnipod specialty medication(s).    Reviewed and verified with patient:       Specialty medication(s) and dose(s) confirmed: yes    Refill Questions      Flowsheet Row Most Recent Value   Changes to allergies? No   Changes to medications? No   New conditions since last clinic visit No   Unplanned office visit, urgent care, ED, or hospital admission in the last 4 weeks  No   How does patient/caregiver feel medication is working? Very good   Financial problems or insurance changes  No   Since the previous refill, were any specialty medication doses or scheduled injections missed or delayed?  No   Does this patient require a clinical escalation to a pharmacist? No                       Follow-up: 28 day(s)     Georgie Scott, Pharmacy Technician  Specialty Pharmacy Technician

## 2023-11-06 ENCOUNTER — SPECIALTY PHARMACY (OUTPATIENT)
Dept: PHARMACY | Facility: HOSPITAL | Age: 63
End: 2023-11-06
Payer: COMMERCIAL

## 2023-11-09 ENCOUNTER — SPECIALTY PHARMACY (OUTPATIENT)
Dept: PHARMACY | Facility: HOSPITAL | Age: 63
End: 2023-11-09
Payer: COMMERCIAL

## 2023-11-30 ENCOUNTER — SPECIALTY PHARMACY (OUTPATIENT)
Dept: PHARMACY | Facility: HOSPITAL | Age: 63
End: 2023-11-30
Payer: COMMERCIAL

## 2023-12-14 ENCOUNTER — SPECIALTY PHARMACY (OUTPATIENT)
Dept: PHARMACY | Facility: HOSPITAL | Age: 63
End: 2023-12-14
Payer: COMMERCIAL

## 2023-12-21 ENCOUNTER — SPECIALTY PHARMACY (OUTPATIENT)
Dept: ENDOCRINOLOGY | Facility: CLINIC | Age: 63
End: 2023-12-21
Payer: COMMERCIAL

## 2023-12-21 ENCOUNTER — OFFICE VISIT (OUTPATIENT)
Dept: ENDOCRINOLOGY | Facility: CLINIC | Age: 63
End: 2023-12-21
Payer: COMMERCIAL

## 2023-12-21 VITALS
HEART RATE: 87 BPM | SYSTOLIC BLOOD PRESSURE: 122 MMHG | WEIGHT: 218.4 LBS | OXYGEN SATURATION: 97 % | BODY MASS INDEX: 38.7 KG/M2 | DIASTOLIC BLOOD PRESSURE: 65 MMHG | HEIGHT: 63 IN

## 2023-12-21 DIAGNOSIS — E11.65 UNCONTROLLED TYPE 2 DIABETES MELLITUS WITH HYPERGLYCEMIA: Primary | ICD-10-CM

## 2023-12-21 LAB
EXPIRATION DATE: ABNORMAL
GLUCOSE BLDC GLUCOMTR-MCNC: 107 MG/DL (ref 70–130)
HBA1C MFR BLD: 6.5 % (ref 4.5–5.7)
Lab: ABNORMAL

## 2023-12-21 PROCEDURE — 82043 UR ALBUMIN QUANTITATIVE: CPT | Performed by: NURSE PRACTITIONER

## 2023-12-21 PROCEDURE — 82570 ASSAY OF URINE CREATININE: CPT | Performed by: NURSE PRACTITIONER

## 2023-12-21 NOTE — PROGRESS NOTES
Chief Complaint   Patient presents with    Diabetes        Mariola Beck is a 63 y.o. female had concerns including Diabetes.    T2DM.    She is checking blood sugars frequently with Dexcom CGM.  Current medications for diabetes include Mounjaro 10 mg weekly, insulin via an Omnipod 5 insulin pump, Metformin 1,000 mg BID.   She is on statin therapy.  Hypos: rarely    Is getting injections in the left eye for diabetic retinopathy.  She has a history of UTI and yeast infection.  She has not been following a diet/exercise program.  She just recently started her insulin pump. She is doing well with this.    The following portions of the patient's history were reviewed and updated as appropriate: allergies, current medications, past family history, past medical history, past social history, past surgical history and problem list.      Review of Systems   Constitutional:  Positive for fatigue.   Respiratory:  Positive for shortness of breath.    Endocrine: Positive for polydipsia. Negative for polyuria.   All other systems reviewed and are negative.       Physical Exam  Vitals reviewed.   Constitutional:       Appearance: Normal appearance.   Cardiovascular:      Rate and Rhythm: Normal rate.      Pulses:           Dorsalis pedis pulses are 1+ on the right side and 1+ on the left side.        Posterior tibial pulses are 1+ on the right side and 1+ on the left side.   Pulmonary:      Effort: Pulmonary effort is normal.   Feet:      Right foot:      Protective Sensation: 10 sites tested.  5 sites sensed.      Skin integrity: Callus and dry skin present.      Toenail Condition: Right toenails are normal.      Left foot:      Protective Sensation: 10 sites tested.  5 sites sensed.      Skin integrity: Callus and dry skin present.      Toenail Condition: Left toenails are normal.      Comments: Diabetic Foot Exam Performed and Monofilament Test Performed  Skin:     General: Skin is warm.   Neurological:      General: No focal  "deficit present.      Mental Status: She is alert and oriented to person, place, and time.   Psychiatric:         Mood and Affect: Mood normal.         Behavior: Behavior normal.         Thought Content: Thought content normal.         Judgment: Judgment normal.        /65 (BP Location: Left arm, Patient Position: Sitting, Cuff Size: Adult)   Pulse 87   Ht 160 cm (63\")   Wt 99.1 kg (218 lb 6.4 oz)   LMP  (LMP Unknown)   SpO2 97%   BMI 38.69 kg/m²      Labs and imaging    CMP:     Lipid Panel:  No results found for: \"CHOL\", \"TRIG\", \"HDL\", \"VLDL\", \"LDL\"  HbA1c:  Lab Results   Component Value Date    HGBA1C 6.5 (A) 12/21/2023    HGBA1C 9.5 (A) 06/07/2023     Glucose:  Lab Results   Component Value Date    POCGLU 107 12/21/2023     Microalbumin:  Lab Results   Component Value Date    MALBCRERATIO 8.9 12/21/2023     TSH:  No results found for: \"TSH\"    Assessment and plan  Diagnoses and all orders for this visit:    1. Uncontrolled type 2 diabetes mellitus with hyperglycemia (Primary)  Assessment & Plan:  -Diabetes is improving with A1c down to 6.5.  -Continue Mounjaro 10 mg weekly. Patient has no personal history of pancreatitis, no family history of MEN syndrome or medullary thyroid cancer. Possible side effects including nausea, bloating, other GI upset and rarely pancreatitis were discussed. She was advised to call the office with any symptoms or concerns.   -2 week data download of insulin pump shows that her glucoses are overall hyper.  CGM as below:  Very High: 1%  High: 8%  In Range: 89%  Low: 2%  Very Low: 0%  Trend shows overall regulated BG's with some meal time hypers.  Continue with current pump settings as below:  Basal: 2.6 u/hr  CR: 1:3  ISF: 15  -Continue Metformin 1,000 mg BID.  -S/S hypoglycemia discussed with Rule of 15's advised.  -Continue checking BG regularly. Continue using Dexcom CGM.  -Will reassess in 3 months.    Orders:  -     POC Glucose, Blood  -     POC Glycosylated Hemoglobin " (Hb A1C)  -     Microalbumin / Creatinine Urine Ratio - Urine, Clean Catch           Return in about 3 months (around 3/21/2024) for Follow-up appointment, A1C. The patient was instructed to contact the clinic with any interval questions or concerns.    This document has been electronically signed by KHURRAM Maldonado  December 22, 2023 13:17 EST  Endocrinology

## 2023-12-21 NOTE — PROGRESS NOTES
Specialty Pharmacy Patient Management Program  Endocrinology Reassessment     Mariola Beck is a 63 y.o. female with Type 2 Diabetes seen by an Endocrinology provider and enrolled in the Endocrinology Patient Management program offered by [facility].  A follow-up outreach was conducted, including assessment of continued therapy appropriateness, medication adherence, and side effect incidence and management for Mounjaro, Humalog, Omnipod, and Metformin.     Patient is currently taking Mounjaro 10 mg weekly, Metformin 1,000 mg twice daily, and using Humalog in her Omnipod 5 insulin pump. She uses Dexcom to monitor BG. She denies low BG < 70 mg/dL. She reports improvements in her BG since starting on OmniPod 5 insulin pump.     Patient denies personal/family history of thyroid cancer and denies issues with pancreas/pancreatitis.     Changes to Insurance Coverage or Financial Support  None    Relevant Past Medical History and Comorbidities  Relevant medical history and concomitant health conditions were discussed with the patient. The patient's chart has been reviewed for relevant past medical history and comorbid health conditions and updated as necessary.   Past Medical History:   Diagnosis Date    Asthma     Chronic headaches     COPD (chronic obstructive pulmonary disease)     H/O bladder infections     Heart attack     Heart disease     Hypertension     Type 2 diabetes mellitus      Social History     Socioeconomic History    Marital status:    Tobacco Use    Smoking status: Former     Packs/day: 1     Types: Cigarettes    Smokeless tobacco: Never   Vaping Use    Vaping Use: Never used   Substance and Sexual Activity    Alcohol use: Not Currently    Drug use: Never    Sexual activity: Defer       Problem list reviewed by Felisa Mendze RP on 12/21/2023 at 11:16 AM    Allergies  Known allergies and reactions were discussed with the patient. The patient's chart has been reviewed for  "allergy information and updated as necessary.   Plavix [clopidogrel]    Allergies reviewed by Felisa Mendez RP on 12/21/2023 at 11:16 AM    Relevant Laboratory Values  A1C Last 3 Results          2/16/2023    09:25 6/7/2023    14:18 12/21/2023    11:15   HGBA1C Last 3 Results   Hemoglobin A1C 9.1  9.5  6.5      Lab Results   Component Value Date    HGBA1C 6.5 (A) 12/21/2023     No results found for: \"GLUCOSE\", \"CALCIUM\", \"NA\", \"K\", \"CO2\", \"CL\", \"BUN\", \"CREATININE\", \"EGFRIFAFRI\", \"EGFRIFNONA\", \"BCR\", \"ANIONGAP\"  No results found for: \"CHOL\", \"CHLPL\", \"TRIG\", \"HDL\", \"LDL\", \"LDLDIRECT\"      Current Medication List  This medication list has been reviewed with the patient and evaluated for any interactions or necessary modifications/recommendations, and updated to include all prescription medications, OTC medications, and supplements the patient is currently taking.  This list reflects what is contained in the patient's profile, which has also been marked as reviewed to communicate to other providers it is the most up to date version of the patient's current medication therapy.     Current Outpatient Medications:     amLODIPine (NORVASC) 2.5 MG tablet, TAKE 1 TABLET BY MOUTH 1 TIME EACH DAY., Disp: , Rfl:     aspirin 81 MG EC tablet, Take 1 tablet by mouth Daily., Disp: , Rfl:     Calcium Carbonate (CALCIUM 600 PO), Take 600 mg by mouth Daily., Disp: , Rfl:     coenzyme Q10 100 MG capsule, Take 1 capsule by mouth Daily., Disp: , Rfl:     Continuous Blood Gluc  (Dexcom G6 ) device, 1 each Continuous., Disp: 1 each, Rfl: 0    Continuous Blood Gluc Sensor (Dexcom G6 Sensor), Change sensor every 10 (Ten) Days., Disp: 3 each, Rfl: 5    Continuous Blood Gluc Transmit (Dexcom G6 Transmitter) misc, Change transmitter Every 3 (Three) Months., Disp: 1 each, Rfl: 1    Cyanocobalamin (B-12) 1000 MCG sublingual tablet, Take 1 tablet by mouth Daily., Disp: , Rfl:     Insulin Disposable Pump (Omnipod 5 G6 Pod, Gen 5,) " misc, Change every day as directed., Disp: 30 each, Rfl: 5    Insulin Lispro (HumaLOG) 100 UNIT/ML injection, For use in insulin pump.  Maximum Daily Dose: 200 units, Disp: 60 mL, Rfl: 5    Magnesium 300 MG capsule, Take 300 mg by mouth 2 (Two) Times a Day., Disp: , Rfl:     metFORMIN (GLUCOPHAGE) 1000 MG tablet, Take 1 tablet by mouth 2 (Two) Times a Day., Disp: 60 tablet, Rfl: 5    metoprolol tartrate (LOPRESSOR) 50 MG tablet, Take 0.5 tablets by mouth 2 (Two) Times a Day., Disp: , Rfl:     montelukast (SINGULAIR) 10 MG tablet, Take 1 tablet by mouth Daily., Disp: , Rfl:     Multiple Vitamins-Minerals (ICAPS AREDS 2 PO), Take 2 capsules by mouth 2 (Two) Times a Day., Disp: , Rfl:     nitroglycerin (NITROSTAT) 0.4 MG SL tablet, DISSOLVE 1 TABLET UNDER THE TONGUE AS NEEDED, Disp: , Rfl:     Omega-3 Fatty Acids (Odorless Coated Fish Oil) 1000 MG capsule delayed-release, 2 (two) times a day., Disp: , Rfl:     pantoprazole (PROTONIX) 40 MG EC tablet, Take 1 tablet by mouth Daily., Disp: , Rfl:     polycarbophil 625 MG tablet tablet, Take  by mouth Daily., Disp: , Rfl:     prasugrel (EFFIENT) 10 MG tablet, Take 1 tablet by mouth Daily., Disp: , Rfl:     rOPINIRole (REQUIP) 0.25 MG tablet, Take 1 tablet by mouth Daily., Disp: , Rfl:     rosuvastatin (CRESTOR) 20 MG tablet, Take 1 tablet by mouth Every Evening., Disp: , Rfl:     Tirzepatide (Mounjaro) 10 MG/0.5ML solution pen-injector pen, Inject 0.5 mL under the skin 1 (One) Time Per Week., Disp: 2 mL, Rfl: 5    vitamin D3 125 MCG (5000 UT) capsule capsule, Take 1 capsule by mouth Daily., Disp: , Rfl:     vitamin E 100 UNIT capsule, Take 1 capsule by mouth Daily., Disp: , Rfl:     insulin detemir (Levemir FlexPen) 100 UNIT/ML injection, Inject 60 Units under the skin into the appropriate area as directed Daily. To have on hand in case of insulin pump failure (Patient not taking: Reported on 9/14/2023), Disp: 15 mL, Rfl: 5    Insulin Lispro, 1 Unit Dial, (HumaLOG KwikPen)  "100 UNIT/ML solution pen-injector, Inject 30 Units under the skin into the appropriate area as directed 3 (Three) Times a Day With Meals. To have on hand in case of insulin pump failure (Patient not taking: Reported on 9/14/2023), Disp: 15 mL, Rfl: 5    Insulin Pen Needle (Pen Needles) 32G X 4 MM misc, Use to inject insulin up to 4 times daily as directed (Patient not taking: Reported on 12/21/2023), Disp: 100 each, Rfl: 5    Insulin Syringe 31G X 5/16\" 1 ML misc, To administer insulin BID. (Patient not taking: Reported on 12/21/2023), Disp: 100 each, Rfl: 2    Medicines reviewed by Felisa Mendez [facility] on 12/21/2023 at 11:18 AM    Drug Interactions  The hypoglycemic effect of Insulin may be increased or prolonged by metoprolol. Minor cardiovascular abnormalities may occur during hypoglycemic episodes.    Recommended Medications Assessment  Aspirin - Currently Taking  Statin - Currently Taking  ACEi/ARB - Not Indicated    Current 10-Year ASCVD Risk: need updated labs to calculate    Adverse Drug Reactions  Adverse Reactions Experienced: None   Plan for ADR Management: Not Required    Hospitalizations and Urgent Care Since Last Assessment  Hospitalizations or Admissions: None  ED Visits: None   Urgent Office Visits: None    Adherence and Self-Administration  Adherence related patient's specialty therapy was discussed with the patient. The Adherence segment of this outreach has been reviewed and updated.     Ongoing or New Barriers to Patient Adherence and/or Self-Administration: None  Methods for Supporting Patient Adherence and/or Self-Administration: None Required    Goals of Therapy  Goals related to the patient's specialty therapy was discussed with the patient. The Patient Goals segment of this outreach has been reviewed and updated.    Goals Addressed Today        Consistently take medications as prescribed      HEMOGLOBIN A1C < 7      Specialty Pharmacy General Goal      Prevent hypoglycemia             Quality " of Life Assessment   Quality of Life related to the patient's specialty therapy was discussed with the patient. The QOL segment of this outreach has been reviewed and updated.    Quality of Life Assessment  Quality of Life Improvement Scale: 9-A good deal better    Reassessment Plan & Follow-Up  Patient's diabetes has improved with A1C down to 6.5%.  Medication Therapy Changes:   Continue Dexcom and Omnipod  Continue Metformin 1,000 mg BID   Continue Mounjaro 10 mg weekly   Continue Humalog in insulin pump  Additional Plans, Therapy Recommendations, or Therapy Problems to Be Addressed: None  Pharmacist to perform regular reassessments no more than (6) months from the previous assessment.  Care Coordinator to set up future refill outreaches, coordinate prescription delivery, and escalate clinical questions to pharmacist.      Attestation  I attest the patient was actively involved in and has agreed to the above plan of care. If the prescribed therapy is at any point deemed not appropriate based on the current or future assessments, a consultation will be initiated with the patient's specialty care provider to determine the best course of action. The revised plan of therapy will be documented along with any required assessments and/or additional patient education provided. [id number]    Felisa Mendez, Shannan, MCKAYLA HOLMAN  Clinical Specialty Pharmacist, Endocrinology   12/21/2023  11:52 EST

## 2023-12-22 LAB
ALBUMIN UR-MCNC: 1.6 MG/DL
CREAT UR-MCNC: 179.1 MG/DL
MICROALBUMIN/CREAT UR: 8.9 MG/G (ref 0–29)

## 2023-12-22 NOTE — ASSESSMENT & PLAN NOTE
-Diabetes is improving with A1c down to 6.5.  -Continue Mounjaro 10 mg weekly. Patient has no personal history of pancreatitis, no family history of MEN syndrome or medullary thyroid cancer. Possible side effects including nausea, bloating, other GI upset and rarely pancreatitis were discussed. She was advised to call the office with any symptoms or concerns.   -2 week data download of insulin pump shows that her glucoses are overall hyper.  CGM as below:  Very High: 1%  High: 8%  In Range: 89%  Low: 2%  Very Low: 0%  Trend shows overall regulated BG's with some meal time hypers.  Continue with current pump settings as below:  Basal: 2.6 u/hr  CR: 1:3  ISF: 15  -Continue Metformin 1,000 mg BID.  -S/S hypoglycemia discussed with Rule of 15's advised.  -Continue checking BG regularly. Continue using Dexcom CGM.  -Will reassess in 3 months.

## 2024-01-04 ENCOUNTER — SPECIALTY PHARMACY (OUTPATIENT)
Dept: PHARMACY | Facility: HOSPITAL | Age: 64
End: 2024-01-04
Payer: COMMERCIAL

## 2024-01-08 ENCOUNTER — PRIOR AUTHORIZATION (OUTPATIENT)
Dept: ENDOCRINOLOGY | Facility: CLINIC | Age: 64
End: 2024-01-08
Payer: COMMERCIAL

## 2024-01-08 NOTE — TELEPHONE ENCOUNTER
PA required for  [facility]. PA was submitted and approved on  01-04-24.     PA reference # [id number]     Coverage Starts on: 1/4/2024 12:00:00 AM, Coverage Ends on: 1/4/2025 12:00:00 AM.    Fabiola Carrillo MA  01/08/24  10:17 EST

## 2024-01-08 NOTE — TELEPHONE ENCOUNTER
PA required for  Omnipod. PA was submitted and approved on 01-04-24.     PA reference #  PA Case: [id number], Status: Approved    Coverage Starts on: 1/4/2024 12:00:00 AM, Coverage Ends on: 1/3/2025 12:00:00 AM.      Fabiola Carrillo MA  01/08/24  10:15 EST

## 2024-01-10 DIAGNOSIS — E11.65 UNCONTROLLED TYPE 2 DIABETES MELLITUS WITH HYPERGLYCEMIA: ICD-10-CM

## 2024-01-10 RX ORDER — INSULIN PMP CART,AUT,G6/7,CNTR
1 EACH SUBCUTANEOUS EVERY OTHER DAY
Qty: 15 EACH | Refills: 5 | Status: SHIPPED | OUTPATIENT
Start: 2024-01-10

## 2024-01-30 ENCOUNTER — SPECIALTY PHARMACY (OUTPATIENT)
Dept: PHARMACY | Facility: HOSPITAL | Age: 64
End: 2024-01-30
Payer: COMMERCIAL

## 2024-02-05 ENCOUNTER — SPECIALTY PHARMACY (OUTPATIENT)
Dept: PHARMACY | Facility: HOSPITAL | Age: 64
End: 2024-02-05
Payer: COMMERCIAL

## 2024-02-13 ENCOUNTER — SPECIALTY PHARMACY (OUTPATIENT)
Dept: PHARMACY | Facility: HOSPITAL | Age: 64
End: 2024-02-13
Payer: COMMERCIAL

## 2024-02-29 ENCOUNTER — SPECIALTY PHARMACY (OUTPATIENT)
Dept: PHARMACY | Facility: HOSPITAL | Age: 64
End: 2024-02-29
Payer: COMMERCIAL

## 2024-02-29 DIAGNOSIS — E11.65 UNCONTROLLED TYPE 2 DIABETES MELLITUS WITH HYPERGLYCEMIA: Primary | ICD-10-CM

## 2024-02-29 NOTE — PROGRESS NOTES
Specialty Pharmacy Patient Management Program  Endocrinology Reassessment     Mariola Beck is a 63 y.o. female with Type 2 Diabetes seen by an Endocrinology provider and enrolled in the Endocrinology Patient Management program offered by [facility].  A follow-up outreach was conducted, including assessment of continued therapy appropriateness, medication adherence, and side effect incidence and management for Mounjaro, Humalog, Omnipod, and Metformin.     Patient is currently taking Mounjaro 10 mg weekly, Metformin 1,000 mg twice daily, and using Humalog in her Omnipod 5 insulin pump. She uses Dexcom to monitor BG. She denies low BG < 70 mg/dL. Due to manufacture back order issues, she is unable to get Mounjaro 10 mg dose.    Patient denies personal/family history of thyroid cancer and denies issues with pancreas/pancreatitis.     Changes to Insurance Coverage or Financial Support  None    Relevant Past Medical History and Comorbidities  Relevant medical history and concomitant health conditions were discussed with the patient. The patient's chart has been reviewed for relevant past medical history and comorbid health conditions and updated as necessary.   Past Medical History:   Diagnosis Date    Asthma     Chronic headaches     COPD (chronic obstructive pulmonary disease)     H/O bladder infections     Heart attack     Heart disease     Hypertension     Type 2 diabetes mellitus      Social History     Socioeconomic History    Marital status:    Tobacco Use    Smoking status: Former     Packs/day: 1     Types: Cigarettes    Smokeless tobacco: Never   Vaping Use    Vaping Use: Never used   Substance and Sexual Activity    Alcohol use: Not Currently    Drug use: Never    Sexual activity: Defer       Problem list reviewed by Felisa Mendez RPH on 2/29/2024 at 10:54 AM  Problem list reviewed by Felisa Mendez RPH on 2/29/2024 at 11:28 AM    Allergies  Known allergies and reactions  "were discussed with the patient. The patient's chart has been reviewed for allergy information and updated as necessary.   Plavix [clopidogrel]    Allergies reviewed by Felisa Mendez RP on 2/29/2024 at 10:54 AM    Relevant Laboratory Values  A1C Last 3 Results          6/7/2023    14:18 12/21/2023    11:15   HGBA1C Last 3 Results   Hemoglobin A1C 9.5  6.5      Lab Results   Component Value Date    HGBA1C 6.5 (A) 12/21/2023     No results found for: \"GLUCOSE\", \"CALCIUM\", \"NA\", \"K\", \"CO2\", \"CL\", \"BUN\", \"CREATININE\", \"EGFRIFAFRI\", \"EGFRIFNONA\", \"BCR\", \"ANIONGAP\"  No results found for: \"CHOL\", \"CHLPL\", \"TRIG\", \"HDL\", \"LDL\", \"LDLDIRECT\"  Microalbumin          12/21/2023    11:52   Microalbumin   Microalbumin, Urine 1.6        Current Medication List  This medication list has been reviewed with the patient and evaluated for any interactions or necessary modifications/recommendations, and updated to include all prescription medications, OTC medications, and supplements the patient is currently taking.  This list reflects what is contained in the patient's profile, which has also been marked as reviewed to communicate to other providers it is the most up to date version of the patient's current medication therapy.     Current Outpatient Medications:     amLODIPine (NORVASC) 2.5 MG tablet, TAKE 1 TABLET BY MOUTH 1 TIME EACH DAY., Disp: , Rfl:     aspirin 81 MG EC tablet, Take 1 tablet by mouth Daily., Disp: , Rfl:     Calcium Carbonate (CALCIUM 600 PO), Take 600 mg by mouth Daily., Disp: , Rfl:     coenzyme Q10 100 MG capsule, Take 1 capsule by mouth Daily., Disp: , Rfl:     Continuous Blood Gluc  (Dexcom G6 ) device, 1 each Continuous., Disp: 1 each, Rfl: 0    Continuous Blood Gluc Sensor (Dexcom G6 Sensor), Change sensor every 10 (Ten) Days., Disp: 3 each, Rfl: 5    Continuous Blood Gluc Transmit (Dexcom G6 Transmitter) misc, Change transmitter Every 3 (Three) Months., Disp: 1 each, Rfl: 1    " "Cyanocobalamin (B-12) 1000 MCG sublingual tablet, Take 1 tablet by mouth Daily., Disp: , Rfl:     insulin detemir (Levemir FlexPen) 100 UNIT/ML injection, Inject 60 Units under the skin into the appropriate area as directed Daily. To have on hand in case of insulin pump failure (Patient not taking: Reported on 9/14/2023), Disp: 15 mL, Rfl: 5    Insulin Disposable Pump (Omnipod 5 G6 Pod, Gen 5,) misc, Use 1 pod Every Other Day., Disp: 15 each, Rfl: 5    Insulin Lispro (HumaLOG) 100 UNIT/ML injection, For use in insulin pump.  Maximum Daily Dose: 200 units, Disp: 60 mL, Rfl: 5    Insulin Lispro, 1 Unit Dial, (HumaLOG KwikPen) 100 UNIT/ML solution pen-injector, Inject 30 Units under the skin into the appropriate area as directed 3 (Three) Times a Day With Meals. To have on hand in case of insulin pump failure (Patient not taking: Reported on 9/14/2023), Disp: 15 mL, Rfl: 5    Insulin Pen Needle (Pen Needles) 32G X 4 MM misc, Use to inject insulin up to 4 times daily as directed (Patient not taking: Reported on 12/21/2023), Disp: 100 each, Rfl: 5    Insulin Syringe 31G X 5/16\" 1 ML misc, To administer insulin BID. (Patient not taking: Reported on 12/21/2023), Disp: 100 each, Rfl: 2    Magnesium 300 MG capsule, Take 300 mg by mouth 2 (Two) Times a Day., Disp: , Rfl:     metFORMIN (GLUCOPHAGE) 1000 MG tablet, Take 1 tablet by mouth 2 (Two) Times a Day., Disp: 60 tablet, Rfl: 5    metoprolol tartrate (LOPRESSOR) 50 MG tablet, Take 0.5 tablets by mouth 2 (Two) Times a Day., Disp: , Rfl:     montelukast (SINGULAIR) 10 MG tablet, Take 1 tablet by mouth Daily., Disp: , Rfl:     Multiple Vitamins-Minerals (ICAPS AREDS 2 PO), Take 2 capsules by mouth 2 (Two) Times a Day., Disp: , Rfl:     nitroglycerin (NITROSTAT) 0.4 MG SL tablet, DISSOLVE 1 TABLET UNDER THE TONGUE AS NEEDED, Disp: , Rfl:     Omega-3 Fatty Acids (Odorless Coated Fish Oil) 1000 MG capsule delayed-release, 2 (two) times a day., Disp: , Rfl:     pantoprazole " (PROTONIX) 40 MG EC tablet, Take 1 tablet by mouth Daily., Disp: , Rfl:     polycarbophil 625 MG tablet tablet, Take  by mouth Daily., Disp: , Rfl:     prasugrel (EFFIENT) 10 MG tablet, Take 1 tablet by mouth Daily., Disp: , Rfl:     rOPINIRole (REQUIP) 0.25 MG tablet, Take 1 tablet by mouth Daily., Disp: , Rfl:     rosuvastatin (CRESTOR) 20 MG tablet, Take 1 tablet by mouth Every Evening., Disp: , Rfl:     Tirzepatide (MOUNJARO) 12.5 MG/0.5ML solution pen-injector pen, Inject 0.5 mL under the skin into the appropriate area as directed 1 (One) Time Per Week., Disp: 2 mL, Rfl: 5    vitamin D3 125 MCG (5000 UT) capsule capsule, Take 1 capsule by mouth Daily., Disp: , Rfl:     vitamin E 100 UNIT capsule, Take 1 capsule by mouth Daily., Disp: , Rfl:     Medicines reviewed by Felisa Mendez, [facility] on 2/29/2024 at 11:28 AM    Drug Interactions  The hypoglycemic effect of Insulin may be increased or prolonged by metoprolol. Minor cardiovascular abnormalities may occur during hypoglycemic episodes.    Recommended Medications Assessment  Aspirin - Currently Taking  Statin - Currently Taking  ACEi/ARB - Not Indicated    Current 10-Year ASCVD Risk: need updated labs to calculate    Adverse Drug Reactions  Adverse Reactions Experienced: None   Plan for ADR Management: Not Required    Hospitalizations and Urgent Care Since Last Assessment  Hospitalizations or Admissions: None  ED Visits: None   Urgent Office Visits: None    Adherence and Self-Administration  Adherence related patient's specialty therapy was discussed with the patient. The Adherence segment of this outreach has been reviewed and updated.     Ongoing or New Barriers to Patient Adherence and/or Self-Administration: None  Methods for Supporting Patient Adherence and/or Self-Administration: None Required    Goals of Therapy  Goals related to the patient's specialty therapy was discussed with the patient. The Patient Goals segment of this outreach has been reviewed and  updated.    Goals Addressed Today        Consistently take medications as prescribed                Reassessment Plan & Follow-Up  Medication Therapy Changes: Per Sheryl Yip, KHURRAM  Increase to Mounjaro 12.5 mg weekly due to issues with manufacture back order of lower doses.   Additional Plans, Therapy Recommendations, or Therapy Problems to Be Addressed: [facility]   Pharmacist to perform regular reassessments no more than (6) months from the previous assessment.  Care Coordinator to set up future refill outreaches, coordinate prescription delivery, and escalate clinical questions to pharmacist.      Attestation  I attest the patient was actively involved in and has agreed to the above plan of care. If the prescribed therapy is at any point deemed not appropriate based on the current or future assessments, a consultation will be initiated with the patient's specialty care provider to determine the best course of action. The revised plan of therapy will be documented along with any required assessments and/or additional patient education provided. [id number]    Felisa Mendez, PharmD, MCKAYLA HOLMAN  Clinical Specialty Pharmacist, Endocrinology   2/29/2024  11:30 EST

## 2024-03-28 ENCOUNTER — OFFICE VISIT (OUTPATIENT)
Dept: ENDOCRINOLOGY | Facility: CLINIC | Age: 64
End: 2024-03-28
Payer: COMMERCIAL

## 2024-03-28 ENCOUNTER — SPECIALTY PHARMACY (OUTPATIENT)
Dept: ENDOCRINOLOGY | Facility: CLINIC | Age: 64
End: 2024-03-28
Payer: COMMERCIAL

## 2024-03-28 VITALS
BODY MASS INDEX: 37.1 KG/M2 | DIASTOLIC BLOOD PRESSURE: 71 MMHG | HEIGHT: 63 IN | HEART RATE: 79 BPM | SYSTOLIC BLOOD PRESSURE: 113 MMHG | WEIGHT: 209.4 LBS | OXYGEN SATURATION: 96 %

## 2024-03-28 DIAGNOSIS — E11.65 UNCONTROLLED TYPE 2 DIABETES MELLITUS WITH HYPERGLYCEMIA: ICD-10-CM

## 2024-03-28 DIAGNOSIS — E11.65 UNCONTROLLED TYPE 2 DIABETES MELLITUS WITH HYPERGLYCEMIA: Primary | ICD-10-CM

## 2024-03-28 PROCEDURE — 99213 OFFICE O/P EST LOW 20 MIN: CPT | Performed by: NURSE PRACTITIONER

## 2024-03-28 RX ORDER — INSULIN LISPRO 100 [IU]/ML
INJECTION, SOLUTION INTRAVENOUS; SUBCUTANEOUS
Qty: 60 ML | Refills: 5 | Status: SHIPPED | OUTPATIENT
Start: 2024-03-28

## 2024-03-28 RX ORDER — PROCHLORPERAZINE 25 MG/1
SUPPOSITORY RECTAL
Qty: 3 EACH | Refills: 5 | Status: SHIPPED | OUTPATIENT
Start: 2024-03-28

## 2024-03-28 RX ORDER — PROCHLORPERAZINE 25 MG/1
1 SUPPOSITORY RECTAL
Qty: 1 EACH | Refills: 1 | Status: SHIPPED | OUTPATIENT
Start: 2024-03-28

## 2024-03-28 RX ORDER — INSULIN PMP CART,AUT,G6/7,CNTR
1 EACH SUBCUTANEOUS EVERY OTHER DAY
Qty: 15 EACH | Refills: 5 | Status: SHIPPED | OUTPATIENT
Start: 2024-03-28

## 2024-03-28 NOTE — PROGRESS NOTES
Chief Complaint   Patient presents with    Diabetes        Mariola Beck is a 63 y.o. female had concerns including Diabetes.    T2DM.    She is checking blood sugars frequently with Dexcom CGM. She has not been using this the last couple of days, due to not reading appropriately.  Current medications for diabetes include Mounjaro 10 mg weekly, insulin via an Omnipod 5 insulin pump, Metformin 1,000 mg BID.   She is on statin therapy.  Hypos: rarely    Is getting injections in the left eye for diabetic retinopathy.  She has a history of UTI and yeast infection.  She has not been following a diet/exercise program.  She is doing well since starting on her insulin pump.    The following portions of the patient's history were reviewed and updated as appropriate: allergies, current medications, past family history, past medical history, past social history, past surgical history and problem list.      Review of Systems   Constitutional:  Positive for fatigue.   Respiratory:  Positive for shortness of breath.    Endocrine: Positive for polydipsia. Negative for polyuria.   All other systems reviewed and are negative.       Physical Exam  Vitals reviewed.   Constitutional:       Appearance: Normal appearance.   Cardiovascular:      Rate and Rhythm: Normal rate.      Pulses:           Dorsalis pedis pulses are 1+ on the right side and 1+ on the left side.        Posterior tibial pulses are 1+ on the right side and 1+ on the left side.   Pulmonary:      Effort: Pulmonary effort is normal.   Feet:      Right foot:      Protective Sensation: 10 sites tested.  5 sites sensed.      Skin integrity: Callus and dry skin present.      Toenail Condition: Right toenails are normal.      Left foot:      Protective Sensation: 10 sites tested.  5 sites sensed.      Skin integrity: Callus and dry skin present.      Toenail Condition: Left toenails are normal.      Comments: Diabetic Foot Exam Performed and Monofilament Test  "Performed  Skin:     General: Skin is warm.   Neurological:      General: No focal deficit present.      Mental Status: She is alert and oriented to person, place, and time.   Psychiatric:         Mood and Affect: Mood normal.         Behavior: Behavior normal.         Thought Content: Thought content normal.         Judgment: Judgment normal.        /71 (BP Location: Right arm, Patient Position: Sitting, Cuff Size: Adult)   Pulse 79   Ht 160 cm (63\")   Wt 95 kg (209 lb 6.4 oz)   LMP  (LMP Unknown)   SpO2 96%   BMI 37.09 kg/m²      Labs and imaging    CMP:     Lipid Panel:  No results found for: \"CHOL\", \"TRIG\", \"HDL\", \"VLDL\", \"LDL\"  HbA1c:  Lab Results   Component Value Date    HGBA1C 6.5 (A) 12/21/2023    HGBA1C 9.5 (A) 06/07/2023   6.6 (01/2024)    Glucose:  Lab Results   Component Value Date    POCGLU 107 12/21/2023     Microalbumin:  Lab Results   Component Value Date    MALBCRERATIO 8.9 12/21/2023     TSH:  No results found for: \"TSH\"    Assessment and plan  Diagnoses and all orders for this visit:    1. Uncontrolled type 2 diabetes mellitus with hyperglycemia (Primary)  Assessment & Plan:  -Diabetes is improving with A1c down to 6.6.  -Continue Mounjaro 10 mg weekly. Patient has no personal history of pancreatitis, no family history of MEN syndrome or medullary thyroid cancer. Possible side effects including nausea, bloating, other GI upset and rarely pancreatitis were discussed. She was advised to call the office with any symptoms or concerns.   -Discussed continued use of BG's regularly.  Continue using CGM.  Unable to download in office as she is not able to get her sensor to connect to her pato or insulin pump.  Discussed changing this to a whole new sensor/transmitter set up and re-entering codes so it will be able to connect appropriately.  -Continue with current insulin pump settings.  -Continue Metformin 1,000 mg BID.  -S/S hypoglycemia discussed with Rule of 15's advised.  -Will reassess in " 3 months.    Orders:  -     Cancel: POC Glycosylated Hemoglobin (Hb A1C)  -     Cancel: POC Glucose, Blood           Return in about 3 months (around 6/28/2024) for Follow-up appointment, A1C. The patient was instructed to contact the clinic with any interval questions or concerns.    This document has been electronically signed by KHURRAM Maldonado  April 10, 2024 13:02 EDT  Endocrinology

## 2024-03-28 NOTE — PROGRESS NOTES
Specialty Pharmacy Patient Management Program  Endocrinology Reassessment     Mariola Beck is a 63 y.o. female with Type 2 Diabetes seen by an Endocrinology provider and enrolled in the Endocrinology Patient Management program offered by [facility].  A follow-up outreach was conducted, including assessment of continued therapy appropriateness, medication adherence, and side effect incidence and management for Mounjaro, Humalog, Omnipod, and Metformin.     Patient is currently taking Mounjaro 12.5 mg weekly, Metformin 1,000 mg twice daily, and using Humalog in her Omnipod 5 insulin pump. She uses Dexcom G6 to monitor BG. She denies low BG < 70 mg/dL. She denies any issues or any missed doses at this time.     Patient denies personal/family history of thyroid cancer and denies issues with pancreas/pancreatitis.     Changes to Insurance Coverage or Financial Support  None    Relevant Past Medical History and Comorbidities  Relevant medical history and concomitant health conditions were discussed with the patient. The patient's chart has been reviewed for relevant past medical history and comorbid health conditions and updated as necessary.   Past Medical History:   Diagnosis Date    Asthma     Chronic headaches     COPD (chronic obstructive pulmonary disease)     H/O bladder infections     Heart attack     Heart disease     Hypertension     Type 2 diabetes mellitus      Social History     Socioeconomic History    Marital status:    Tobacco Use    Smoking status: Former     Current packs/day: 1.00     Types: Cigarettes    Smokeless tobacco: Never   Vaping Use    Vaping status: Never Used   Substance and Sexual Activity    Alcohol use: Not Currently    Drug use: Never    Sexual activity: Defer       Problem list reviewed by Felisa Mendez RPH on 3/28/2024 at  2:24 PM    Allergies  Known allergies and reactions were discussed with the patient. The patient's chart has been reviewed for  "allergy information and updated as necessary.   Plavix [clopidogrel]    Allergies reviewed by Felisa Mendez [facility] on 3/28/2024 at  2:24 PM    Relevant Laboratory Values  A1C Last 3 Results          6/7/2023    14:18 12/21/2023    11:15   HGBA1C Last 3 Results   Hemoglobin A1C 9.5  6.5      Lab Results   Component Value Date    HGBA1C 6.5 (A) 12/21/2023     No results found for: \"GLUCOSE\", \"CALCIUM\", \"NA\", \"K\", \"CO2\", \"CL\", \"BUN\", \"CREATININE\", \"EGFRIFAFRI\", \"EGFRIFNONA\", \"BCR\", \"ANIONGAP\"  No results found for: \"CHOL\", \"CHLPL\", \"TRIG\", \"HDL\", \"LDL\", \"LDLDIRECT\"  Microalbumin          12/21/2023    11:52   Microalbumin   Microalbumin, Urine 1.6        Current Medication List  This medication list has been reviewed with the patient and evaluated for any interactions or necessary modifications/recommendations, and updated to include all prescription medications, OTC medications, and supplements the patient is currently taking.  This list reflects what is contained in the patient's profile, which has also been marked as reviewed to communicate to other providers it is the most up to date version of the patient's current medication therapy.     Current Outpatient Medications:     amLODIPine (NORVASC) 2.5 MG tablet, TAKE 1 TABLET BY MOUTH 1 TIME EACH DAY., Disp: , Rfl:     aspirin 81 MG EC tablet, Take 1 tablet by mouth Daily., Disp: , Rfl:     Calcium Carbonate (CALCIUM 600 PO), Take 600 mg by mouth Daily., Disp: , Rfl:     coenzyme Q10 100 MG capsule, Take 1 capsule by mouth Daily., Disp: , Rfl:     Continuous Blood Gluc  (Dexcom G6 ) device, 1 each Continuous., Disp: 1 each, Rfl: 0    Continuous Blood Gluc Sensor (Dexcom G6 Sensor), Change sensor every 10 (Ten) Days., Disp: 3 each, Rfl: 5    Continuous Blood Gluc Transmit (Dexcom G6 Transmitter) misc, Change transmitter Every 3 (Three) Months., Disp: 1 each, Rfl: 1    Cyanocobalamin (B-12) 1000 MCG sublingual tablet, Take 1 tablet by mouth Daily., Disp: " ", Rfl:     Insulin Disposable Pump (Omnipod 5 G6 Pods, Gen 5,) misc, Use 1 pod Every Other Day., Disp: 15 each, Rfl: 5    Insulin Lispro (HumaLOG) 100 UNIT/ML injection, For use in insulin pump.  Maximum Daily Dose: 200 units, Disp: 60 mL, Rfl: 5    Insulin Pen Needle (Pen Needles) 32G X 4 MM misc, Use to inject insulin up to 4 times daily as directed, Disp: 100 each, Rfl: 5    Insulin Syringe 31G X 5/16\" 1 ML misc, To administer insulin BID., Disp: 100 each, Rfl: 2    Magnesium 300 MG capsule, Take 300 mg by mouth 2 (Two) Times a Day., Disp: , Rfl:     metFORMIN (GLUCOPHAGE) 1000 MG tablet, Take 1 tablet by mouth 2 (Two) Times a Day., Disp: 60 tablet, Rfl: 5    metoprolol tartrate (LOPRESSOR) 25 MG tablet, Take 1 tablet by mouth 2 (Two) Times a Day., Disp: , Rfl:     montelukast (SINGULAIR) 10 MG tablet, Take 1 tablet by mouth Daily., Disp: , Rfl:     Multiple Vitamins-Minerals (ICAPS AREDS 2 PO), Take 2 capsules by mouth 2 (Two) Times a Day., Disp: , Rfl:     nitroglycerin (NITROSTAT) 0.4 MG SL tablet, DISSOLVE 1 TABLET UNDER THE TONGUE AS NEEDED, Disp: , Rfl:     Omega-3 Fatty Acids (Odorless Coated Fish Oil) 1000 MG capsule delayed-release, 2 (two) times a day., Disp: , Rfl:     pantoprazole (PROTONIX) 40 MG EC tablet, Take 1 tablet by mouth Daily., Disp: , Rfl:     polycarbophil 625 MG tablet tablet, Take  by mouth Daily., Disp: , Rfl:     prasugrel (EFFIENT) 10 MG tablet, Take 1 tablet by mouth Daily., Disp: , Rfl:     rOPINIRole (REQUIP) 0.25 MG tablet, Take 1 tablet by mouth Daily., Disp: , Rfl:     rosuvastatin (CRESTOR) 20 MG tablet, Take 1 tablet by mouth Every Evening., Disp: , Rfl:     Tirzepatide (MOUNJARO) 12.5 MG/0.5ML solution pen-injector pen, Inject 0.5 mL under the skin into the appropriate area as directed 1 (One) Time Per Week., Disp: 2 mL, Rfl: 5    vitamin D3 125 MCG (5000 UT) capsule capsule, Take 1 capsule by mouth Daily., Disp: , Rfl:     vitamin E 100 UNIT capsule, Take 1 capsule by mouth " Daily., Disp: , Rfl:     insulin detemir (Levemir FlexPen) 100 UNIT/ML injection, Inject 60 Units under the skin into the appropriate area as directed Daily. To have on hand in case of insulin pump failure (Patient not taking: Reported on 9/14/2023), Disp: 15 mL, Rfl: 5    Insulin Lispro, 1 Unit Dial, (HumaLOG KwikPen) 100 UNIT/ML solution pen-injector, Inject 30 Units under the skin into the appropriate area as directed 3 (Three) Times a Day With Meals. To have on hand in case of insulin pump failure (Patient not taking: Reported on 9/14/2023), Disp: 15 mL, Rfl: 5    Medicines reviewed by Felisa Mendez [facility] on 3/28/2024 at  2:27 PM    Drug Interactions  The hypoglycemic effect of Insulin may be increased or prolonged by metoprolol. Minor cardiovascular abnormalities may occur during hypoglycemic episodes.    Recommended Medications Assessment  Aspirin - Currently Taking  Statin - Currently Taking  ACEi/ARB - Not Indicated    Adverse Drug Reactions  Adverse Reactions Experienced: None   Plan for ADR Management: Not Required    Hospitalizations and Urgent Care Since Last Assessment  Hospitalizations or Admissions: None  ED Visits: None   Urgent Office Visits: None    Adherence and Self-Administration  Adherence related patient's specialty therapy was discussed with the patient. The Adherence segment of this outreach has been reviewed and updated.     Ongoing or New Barriers to Patient Adherence and/or Self-Administration: None  Methods for Supporting Patient Adherence and/or Self-Administration: None Required    Goals of Therapy  Goals related to the patient's specialty therapy was discussed with the patient. The Patient Goals segment of this outreach has been reviewed and updated.    Goals Addressed Today        Consistently take medications as prescribed      Specialty Pharmacy General Goal      Prevent hypoglycemia            Quality of Life Assessment   Quality of Life related to the patient's specialty therapy  was discussed with the patient. The QOL segment of this outreach has been reviewed and updated.    Quality of Life Assessment  Quality of Life Improvement Scale: 10-Significantly better    Reassessment Plan & Follow-Up  Patient's diabetes remains at goal with A1C of 6.5%  Medication Therapy Changes: Per KHURRAM Maldonado  No changes today. Continue current regimen.   Additional Plans, Therapy Recommendations, or Therapy Problems to Be Addressed: [facility] to [facility].   Pharmacist to perform regular reassessments no more than (6) months from the previous assessment.  Care Coordinator to set up future refill outreaches, coordinate prescription delivery, and escalate clinical questions to pharmacist.      Attestation  I attest the patient was actively involved in and has agreed to the above plan of care. If the prescribed therapy is at any point deemed not appropriate based on the current or future assessments, a consultation will be initiated with the patient's specialty care provider to determine the best course of action. The revised plan of therapy will be documented along with any required assessments and/or additional patient education provided.     Felisa Mendez, PharmD, MCKAYLA HOLMAN  Clinical Specialty Pharmacist, Endocrinology   3/28/2024  15:36 EDT

## 2024-04-05 ENCOUNTER — SPECIALTY PHARMACY (OUTPATIENT)
Dept: PHARMACY | Facility: HOSPITAL | Age: 64
End: 2024-04-05
Payer: COMMERCIAL

## 2024-04-05 NOTE — PROGRESS NOTES
Specialty Pharmacy Patient Management Program  Prior Authorization Approval     Prior Authorization for Mounjaro was Approved    Approval Start Date: 4/5/2024  Approval End Date: 4/5/2025  Authorization / Reference / Case Number: [id number]    CoverMyMeds Key: [id number]    Thank you,     Felisa Mendez, PharmD, MCKAYLA HOLMAN  Clinical Specialty Pharmacist, Endocrinology  04/05/24  13:04 EDT

## 2024-04-10 NOTE — ASSESSMENT & PLAN NOTE
-Diabetes is improving with A1c down to 6.6.  -Continue Mounjaro 10 mg weekly. Patient has no personal history of pancreatitis, no family history of MEN syndrome or medullary thyroid cancer. Possible side effects including nausea, bloating, other GI upset and rarely pancreatitis were discussed. She was advised to call the office with any symptoms or concerns.   -Discussed continued use of BG's regularly.  Continue using CGM.  Unable to download in office as she is not able to get her sensor to connect to her pato or insulin pump.  Discussed changing this to a whole new sensor/transmitter set up and re-entering codes so it will be able to connect appropriately.  -Continue with current insulin pump settings.  -Continue Metformin 1,000 mg BID.  -S/S hypoglycemia discussed with Rule of 15's advised.  -Will reassess in 3 months.

## 2024-04-12 ENCOUNTER — SPECIALTY PHARMACY (OUTPATIENT)
Dept: PHARMACY | Facility: HOSPITAL | Age: 64
End: 2024-04-12
Payer: COMMERCIAL

## 2024-04-23 ENCOUNTER — SPECIALTY PHARMACY (OUTPATIENT)
Dept: PHARMACY | Facility: HOSPITAL | Age: 64
End: 2024-04-23
Payer: COMMERCIAL

## 2024-04-23 NOTE — PROGRESS NOTES
Specialty Pharmacy Refill Coordination Note     Mariola is a 63 y.o. female contacted today regarding refills of  Omnipod specialty medication(s).    Reviewed and verified with patient:       Specialty medication(s) and dose(s) confirmed: yes    Refill Questions      Flowsheet Row Most Recent Value   Changes to allergies? No   Changes to medications? No   New conditions or infections since last clinic visit No   Unplanned office visit, urgent care, ED, or hospital admission in the last 4 weeks  No   How does patient/caregiver feel medication is working? Very good   Financial problems or insurance changes  No   Since the previous refill, were any specialty medication doses or scheduled injections missed or delayed?  No   Does this patient require a clinical escalation to a pharmacist? No            Delivery Questions      Flowsheet Row Most Recent Value   Copay verified? No   Copay amount na   Copay form of payment Credit/debit on file                   Follow-up: 30 day(s)     Shweta Richardson, Pharmacy Technician  Specialty Pharmacy Technician

## 2024-04-30 ENCOUNTER — SPECIALTY PHARMACY (OUTPATIENT)
Dept: PHARMACY | Facility: HOSPITAL | Age: 64
End: 2024-04-30
Payer: COMMERCIAL

## 2024-04-30 NOTE — PROGRESS NOTES
Specialty Pharmacy Refill Coordination Note     Mariola is a 63 y.o. female contacted today regarding refills of  Metformin specialty medication(s).    Reviewed and verified with patient:       Specialty medication(s) and dose(s) confirmed: yes    Refill Questions      Flowsheet Row Most Recent Value   Changes to allergies? No   Changes to medications? No   New conditions or infections since last clinic visit No   Unplanned office visit, urgent care, ED, or hospital admission in the last 4 weeks  No   How does patient/caregiver feel medication is working? Very good   Financial problems or insurance changes  No   Since the previous refill, were any specialty medication doses or scheduled injections missed or delayed?  No   Does this patient require a clinical escalation to a pharmacist? No            Delivery Questions      Flowsheet Row Most Recent Value   Copay verified? No   Copay amount na   Copay form of payment No copayment ($0)                   Follow-up: 30 day(s)     Shweta Richardson Pharmacy Technician  Specialty Pharmacy Technician

## 2024-05-03 ENCOUNTER — SPECIALTY PHARMACY (OUTPATIENT)
Dept: PHARMACY | Facility: HOSPITAL | Age: 64
End: 2024-05-03
Payer: COMMERCIAL

## 2024-05-06 ENCOUNTER — SPECIALTY PHARMACY (OUTPATIENT)
Dept: PHARMACY | Facility: HOSPITAL | Age: 64
End: 2024-05-06
Payer: COMMERCIAL

## 2024-05-17 ENCOUNTER — SPECIALTY PHARMACY (OUTPATIENT)
Dept: PHARMACY | Facility: HOSPITAL | Age: 64
End: 2024-05-17
Payer: COMMERCIAL

## 2024-05-17 NOTE — PROGRESS NOTES
Specialty Pharmacy Refill Coordination Note     Mariola is a 63 y.o. female contacted today regarding refills of  Omnipod specialty medication(s).    Reviewed and verified with patient:       Specialty medication(s) and dose(s) confirmed: yes    Refill Questions      Flowsheet Row Most Recent Value   Changes to allergies? No   Changes to medications? No   New conditions or infections since last clinic visit No   Unplanned office visit, urgent care, ED, or hospital admission in the last 4 weeks  No   How does patient/caregiver feel medication is working? Very good   Financial problems or insurance changes  No   Since the previous refill, were any specialty medication doses or scheduled injections missed or delayed?  No   Does this patient require a clinical escalation to a pharmacist? No            Delivery Questions      Flowsheet Row Most Recent Value   Copay verified? No   Copay amount na   Copay form of payment No copayment ($0)                   Follow-up: 30 day(s)     Shweta Richardson Pharmacy Technician  Specialty Pharmacy Technician

## 2024-05-28 ENCOUNTER — SPECIALTY PHARMACY (OUTPATIENT)
Dept: PHARMACY | Facility: HOSPITAL | Age: 64
End: 2024-05-28
Payer: COMMERCIAL

## 2024-05-28 NOTE — PROGRESS NOTES
Specialty Pharmacy Refill Coordination Note     Mariola is a 64 y.o. female contacted today regarding refills of  Dexcom transmitter, humalog, mounjaro specialty medication(s).    Reviewed and verified with patient:       Specialty medication(s) and dose(s) confirmed: yes    Refill Questions      Flowsheet Row Most Recent Value   Changes to allergies? No   Changes to medications? No   New conditions or infections since last clinic visit No   Unplanned office visit, urgent care, ED, or hospital admission in the last 4 weeks  No   How does patient/caregiver feel medication is working? Very good   Financial problems or insurance changes  No   Since the previous refill, were any specialty medication doses or scheduled injections missed or delayed?  No   Does this patient require a clinical escalation to a pharmacist? No            Delivery Questions      Flowsheet Row Most Recent Value   Copay verified? No   Copay amount na   Copay form of payment No copayment ($0)                   Follow-up: 30 day(s)     Shweta Richardson Pharmacy Technician  Specialty Pharmacy Technician

## 2024-06-04 ENCOUNTER — SPECIALTY PHARMACY (OUTPATIENT)
Dept: PHARMACY | Facility: HOSPITAL | Age: 64
End: 2024-06-04
Payer: COMMERCIAL

## 2024-06-04 NOTE — PROGRESS NOTES
Specialty Pharmacy Refill Coordination Note     Mariola is a 64 y.o. female contacted today regarding refills of  Metformin specialty medication(s).    Reviewed and verified with patient:       Specialty medication(s) and dose(s) confirmed: yes    Refill Questions      Flowsheet Row Most Recent Value   Changes to allergies? No   Changes to medications? No   New conditions or infections since last clinic visit No   Unplanned office visit, urgent care, ED, or hospital admission in the last 4 weeks  No   How does patient/caregiver feel medication is working? Very good   Financial problems or insurance changes  No   Since the previous refill, were any specialty medication doses or scheduled injections missed or delayed?  No   Does this patient require a clinical escalation to a pharmacist? No            Delivery Questions      Flowsheet Row Most Recent Value   Copay verified? No   Copay amount na   Copay form of payment No copayment ($0)                   Follow-up: 30 day(s)     Shweta Richardson Pharmacy Technician  Specialty Pharmacy Technician

## 2024-06-19 ENCOUNTER — SPECIALTY PHARMACY (OUTPATIENT)
Dept: PHARMACY | Facility: HOSPITAL | Age: 64
End: 2024-06-19
Payer: COMMERCIAL

## 2024-06-19 NOTE — PROGRESS NOTES
Specialty Pharmacy Refill Coordination Note     Mariola is a 64 y.o. female contacted today regarding refills of  Dexcom sensor, Mounjaro, Omnipod specialty medication(s).    Reviewed and verified with patient:       Specialty medication(s) and dose(s) confirmed: yes    Refill Questions      Flowsheet Row Most Recent Value   Changes to allergies? No   Changes to medications? No   New conditions or infections since last clinic visit No   Unplanned office visit, urgent care, ED, or hospital admission in the last 4 weeks  No   How does patient/caregiver feel medication is working? Very good   Financial problems or insurance changes  No   Since the previous refill, were any specialty medication doses or scheduled injections missed or delayed?  No   Does this patient require a clinical escalation to a pharmacist? No            Delivery Questions      Flowsheet Row Most Recent Value   Copay verified? No   Copay amount na   Copay form of payment No copayment ($0)                   Follow-up: 30 day(s)     Shweta Richardson Pharmacy Technician  Specialty Pharmacy Technician

## 2024-06-21 ENCOUNTER — SPECIALTY PHARMACY (OUTPATIENT)
Dept: PHARMACY | Facility: HOSPITAL | Age: 64
End: 2024-06-21
Payer: COMMERCIAL

## 2024-06-21 NOTE — PROGRESS NOTES
Specialty Pharmacy Refill Coordination Note     Mariola is a 64 y.o. female contacted today regarding refills of  Humalog and Dexcom sensor specialty medication(s).    Reviewed and verified with patient:       Specialty medication(s) and dose(s) confirmed: yes    Refill Questions      Flowsheet Row Most Recent Value   Changes to allergies? No   Changes to medications? No   New conditions or infections since last clinic visit No   Unplanned office visit, urgent care, ED, or hospital admission in the last 4 weeks  No   How does patient/caregiver feel medication is working? Very good   Financial problems or insurance changes  No   Since the previous refill, were any specialty medication doses or scheduled injections missed or delayed?  No   Does this patient require a clinical escalation to a pharmacist? No            Delivery Questions      Flowsheet Row Most Recent Value   Copay verified? No   Copay amount na   Copay form of payment No copayment ($0)                   Follow-up: 30 day(s)     Shweta Richardson Pharmacy Technician  Specialty Pharmacy Technician

## 2024-07-22 ENCOUNTER — SPECIALTY PHARMACY (OUTPATIENT)
Dept: PHARMACY | Facility: HOSPITAL | Age: 64
End: 2024-07-22
Payer: COMMERCIAL

## 2024-07-22 NOTE — PROGRESS NOTES
Specialty Pharmacy Refill Coordination Note     Mariola is a 64 y.o. female contacted today regarding refills of  Omnipod specialty medication(s).    Reviewed and verified with patient:       Specialty medication(s) and dose(s) confirmed: yes    Refill Questions      Flowsheet Row Most Recent Value   Changes to allergies? No   Changes to medications? No   New conditions or infections since last clinic visit No   Unplanned office visit, urgent care, ED, or hospital admission in the last 4 weeks  No   How does patient/caregiver feel medication is working? Very good   Financial problems or insurance changes  No   Since the previous refill, were any specialty medication doses or scheduled injections missed or delayed?  No   Does this patient require a clinical escalation to a pharmacist? No            Delivery Questions      Flowsheet Row Most Recent Value   Copay verified? No   Copay amount na   Copay form of payment No copayment ($0)                   Follow-up: 30 day(s)     Shweta Richardson Pharmacy Technician  Specialty Pharmacy Technician

## 2024-07-25 ENCOUNTER — SPECIALTY PHARMACY (OUTPATIENT)
Dept: PHARMACY | Facility: HOSPITAL | Age: 64
End: 2024-07-25
Payer: COMMERCIAL

## 2024-07-25 DIAGNOSIS — E11.65 UNCONTROLLED TYPE 2 DIABETES MELLITUS WITH HYPERGLYCEMIA: Primary | ICD-10-CM

## 2024-07-25 NOTE — PROGRESS NOTES
Specialty Pharmacy Patient Management Program  Endocrinology Reassessment     Mariola Beck is a 64 y.o. female with Type 2 Diabetes seen by an Endocrinology provider and enrolled in the Endocrinology Patient Management program offered by [facility].  A follow-up outreach was conducted, including assessment of continued therapy appropriateness, medication adherence, and side effect incidence and management for Mounjaro, Humalog, Omnipod, and Metformin.     Patient is currently taking Mounjaro 12.5 mg weekly which is currently on manufacture backorder. Patient and provider are agreeable to decreasing to Mounjaro 10 mg weekly. Patient denies low BG < 70 mg/dL. She denies any issues or any missed doses at this time.     Patient denies personal/family history of thyroid cancer and denies issues with pancreas/pancreatitis.     Changes to Insurance Coverage or Financial Support  None    Relevant Past Medical History and Comorbidities  Relevant medical history and concomitant health conditions were discussed with the patient. The patient's chart has been reviewed for relevant past medical history and comorbid health conditions and updated as necessary.   Past Medical History:   Diagnosis Date    Asthma     Chronic headaches     COPD (chronic obstructive pulmonary disease)     H/O bladder infections     Heart attack     Heart disease     Hypertension     Type 2 diabetes mellitus      Social History     Socioeconomic History    Marital status:    Tobacco Use    Smoking status: Former     Current packs/day: 1.00     Types: Cigarettes    Smokeless tobacco: Never   Vaping Use    Vaping status: Never Used   Substance and Sexual Activity    Alcohol use: Not Currently    Drug use: Never    Sexual activity: Defer       Problem list reviewed by Felisa Mendez, PharmD on 7/25/2024 at 10:11 AM    Allergies  Known allergies and reactions were discussed with the patient. The patient's chart has been  "reviewed for allergy information and updated as necessary.   Plavix [clopidogrel]    Allergies reviewed by Felisa Mendez, PharmD on 7/25/2024 at 10:11 AM    Relevant Laboratory Values  A1C Last 3 Results          12/21/2023    11:15   HGBA1C Last 3 Results   Hemoglobin A1C 6.5      Lab Results   Component Value Date    HGBA1C 6.5 (A) 12/21/2023     No results found for: \"GLUCOSE\", \"CALCIUM\", \"NA\", \"K\", \"CO2\", \"CL\", \"BUN\", \"CREATININE\", \"EGFRIFAFRI\", \"EGFRIFNONA\", \"BCR\", \"ANIONGAP\"  No results found for: \"CHOL\", \"CHLPL\", \"TRIG\", \"HDL\", \"LDL\", \"LDLDIRECT\"  Microalbumin          12/21/2023    11:52   Microalbumin   Microalbumin, Urine 1.6        Current Medication List  This medication list has been reviewed with the patient and evaluated for any interactions or necessary modifications/recommendations, and updated to include all prescription medications, OTC medications, and supplements the patient is currently taking.  This list reflects what is contained in the patient's profile, which has also been marked as reviewed to communicate to other providers it is the most up to date version of the patient's current medication therapy.     Current Outpatient Medications:     amLODIPine (NORVASC) 2.5 MG tablet, TAKE 1 TABLET BY MOUTH 1 TIME EACH DAY., Disp: , Rfl:     aspirin 81 MG EC tablet, Take 1 tablet by mouth Daily., Disp: , Rfl:     Berberine Chloride (BERBERINE HCI PO), Take 2 tablets by mouth Daily., Disp: , Rfl:     Calcium Carbonate (CALCIUM 600 PO), Take 600 mg by mouth Daily., Disp: , Rfl:     coenzyme Q10 100 MG capsule, Take 1 capsule by mouth Daily., Disp: , Rfl:     Continuous Blood Gluc  (Dexcom G6 ) device, 1 each Continuous., Disp: 1 each, Rfl: 0    Continuous Glucose Sensor (Dexcom G6 Sensor), Change sensor every 10 (Ten) Days., Disp: 3 each, Rfl: 5    Continuous Glucose Transmitter (Dexcom G6 Transmitter) misc, Change transmitter Every 3 (Three) Months., Disp: 1 each, Rfl: 1    " "Cyanocobalamin (B-12) 1000 MCG sublingual tablet, Take 1 tablet by mouth Daily., Disp: , Rfl:     insulin detemir (Levemir FlexPen) 100 UNIT/ML injection, Inject 60 Units under the skin into the appropriate area as directed Daily. To have on hand in case of insulin pump failure (Patient not taking: Reported on 9/14/2023), Disp: 15 mL, Rfl: 5    Insulin Disposable Pump (Omnipod 5 G6 Pods, Gen 5,) misc, Use 1 pod Every Other Day., Disp: 15 each, Rfl: 5    Insulin Lispro (HumaLOG) 100 UNIT/ML injection, For use in insulin pump.  Maximum Daily Dose: 200 units, Disp: 60 mL, Rfl: 5    Insulin Lispro, 1 Unit Dial, (HumaLOG KwikPen) 100 UNIT/ML solution pen-injector, Inject 30 Units under the skin into the appropriate area as directed 3 (Three) Times a Day With Meals. To have on hand in case of insulin pump failure (Patient not taking: Reported on 9/14/2023), Disp: 15 mL, Rfl: 5    Insulin Pen Needle (Pen Needles) 32G X 4 MM misc, Use to inject insulin up to 4 times daily as directed, Disp: 100 each, Rfl: 5    Insulin Syringe 31G X 5/16\" 1 ML misc, To administer insulin BID., Disp: 100 each, Rfl: 2    Magnesium 300 MG capsule, Take 300 mg by mouth 2 (Two) Times a Day., Disp: , Rfl:     metFORMIN (GLUCOPHAGE) 1000 MG tablet, Take 1 tablet by mouth 2 (Two) Times a Day., Disp: 60 tablet, Rfl: 5    metoprolol tartrate (LOPRESSOR) 25 MG tablet, Take 1 tablet by mouth 2 (Two) Times a Day., Disp: , Rfl:     montelukast (SINGULAIR) 10 MG tablet, Take 1 tablet by mouth Daily., Disp: , Rfl:     Multiple Vitamins-Minerals (ICAPS AREDS 2 PO), Take 2 capsules by mouth 2 (Two) Times a Day., Disp: , Rfl:     nitroglycerin (NITROSTAT) 0.4 MG SL tablet, DISSOLVE 1 TABLET UNDER THE TONGUE AS NEEDED, Disp: , Rfl:     Omega-3 Fatty Acids (Odorless Coated Fish Oil) 1000 MG capsule delayed-release, 2 (two) times a day., Disp: , Rfl:     pantoprazole (PROTONIX) 40 MG EC tablet, Take 1 tablet by mouth Daily., Disp: , Rfl:     polycarbophil 625 MG " tablet tablet, Take  by mouth Daily., Disp: , Rfl:     prasugrel (EFFIENT) 10 MG tablet, Take 1 tablet by mouth Daily., Disp: , Rfl:     rOPINIRole (REQUIP) 0.25 MG tablet, Take 1 tablet by mouth Daily., Disp: , Rfl:     rosuvastatin (CRESTOR) 20 MG tablet, Take 1 tablet by mouth Every Evening., Disp: , Rfl:     Tirzepatide (MOUNJARO) 10 MG/0.5ML solution pen-injector pen, Inject 0.5 mL under the skin into the appropriate area as directed 1 (One) Time Per Week., Disp: 2 mL, Rfl: 5    vitamin D3 125 MCG (5000 UT) capsule capsule, Take 1 capsule by mouth Daily., Disp: , Rfl:     vitamin E 100 UNIT capsule, Take 1 capsule by mouth Daily., Disp: , Rfl:     Medicines reviewed by Felisa Mendez, PharmD on 7/25/2024 at 10:11 AM    Drug Interactions  The hypoglycemic effect of Insulin may be increased or prolonged by metoprolol. Minor cardiovascular abnormalities may occur during hypoglycemic episodes.    Recommended Medications Assessment  Aspirin - Currently Taking  Statin - Currently Taking  ACEi/ARB - Not Indicated    Adverse Drug Reactions  Adverse Reactions Experienced: None   Plan for ADR Management: Not Required    Hospitalizations and Urgent Care Since Last Assessment  Hospitalizations or Admissions: None  ED Visits: None   Urgent Office Visits: None    Adherence and Self-Administration  Adherence related patient's specialty therapy was discussed with the patient. The Adherence segment of this outreach has been reviewed and updated.     Ongoing or New Barriers to Patient Adherence and/or Self-Administration: None  Methods for Supporting Patient Adherence and/or Self-Administration: None Required    Goals of Therapy  Goals related to the patient's specialty therapy was discussed with the patient. The Patient Goals segment of this outreach has been reviewed and updated.    Goals Addressed Today        Consistently take medications as prescribed              Reassessment Plan & Follow-Up  Patient's diabetes remains at  goal with A1C of 6.5%.   Medication Therapy Changes: Per Sheryl Yip, APRN  Will order Mounjaro 10 mg weekly   Additional Plans, Therapy Recommendations, or Therapy Problems to Be Addressed: [facility].   Pharmacist to perform regular reassessments no more than (6) months from the previous assessment.  Care Coordinator to set up future refill outreaches, coordinate prescription delivery, and escalate clinical questions to pharmacist.      Attestation  I attest the patient was actively involved in and has agreed to the above plan of care. If the prescribed therapy is at any point deemed not appropriate based on the current or future assessments, a consultation will be initiated with the patient's specialty care provider to determine the best course of action. The revised plan of therapy will be documented along with any required assessments and/or additional patient education provided.     Felisa Mendez, PharmD, MCKAYLA HOLMAN  Clinical Specialty Pharmacist, Endocrinology   7/25/2024  11:37 EDT

## 2024-07-29 ENCOUNTER — OFFICE VISIT (OUTPATIENT)
Dept: ENDOCRINOLOGY | Facility: CLINIC | Age: 64
End: 2024-07-29
Payer: COMMERCIAL

## 2024-07-29 ENCOUNTER — SPECIALTY PHARMACY (OUTPATIENT)
Dept: PHARMACY | Facility: HOSPITAL | Age: 64
End: 2024-07-29
Payer: COMMERCIAL

## 2024-07-29 VITALS
OXYGEN SATURATION: 96 % | BODY MASS INDEX: 34.73 KG/M2 | DIASTOLIC BLOOD PRESSURE: 71 MMHG | HEIGHT: 63 IN | SYSTOLIC BLOOD PRESSURE: 121 MMHG | HEART RATE: 87 BPM | WEIGHT: 196 LBS

## 2024-07-29 DIAGNOSIS — L97.511 DIABETIC ULCER OF TOE OF RIGHT FOOT ASSOCIATED WITH TYPE 2 DIABETES MELLITUS, LIMITED TO BREAKDOWN OF SKIN: ICD-10-CM

## 2024-07-29 DIAGNOSIS — E11.621 DIABETIC ULCER OF TOE OF RIGHT FOOT ASSOCIATED WITH TYPE 2 DIABETES MELLITUS, LIMITED TO BREAKDOWN OF SKIN: ICD-10-CM

## 2024-07-29 DIAGNOSIS — E11.65 UNCONTROLLED TYPE 2 DIABETES MELLITUS WITH HYPERGLYCEMIA: Primary | ICD-10-CM

## 2024-07-29 DIAGNOSIS — E11.65 UNCONTROLLED TYPE 2 DIABETES MELLITUS WITH HYPERGLYCEMIA: ICD-10-CM

## 2024-07-29 PROCEDURE — 95251 CONT GLUC MNTR ANALYSIS I&R: CPT | Performed by: NURSE PRACTITIONER

## 2024-07-29 PROCEDURE — 99214 OFFICE O/P EST MOD 30 MIN: CPT | Performed by: NURSE PRACTITIONER

## 2024-07-29 RX ORDER — INSULIN GLARGINE 100 [IU]/ML
60 INJECTION, SOLUTION SUBCUTANEOUS DAILY
Qty: 15 ML | Refills: 0 | Status: SHIPPED | OUTPATIENT
Start: 2024-07-29 | End: 2024-07-29 | Stop reason: CLARIF

## 2024-07-29 RX ORDER — INSULIN DEGLUDEC 100 U/ML
60 INJECTION, SOLUTION SUBCUTANEOUS DAILY
Qty: 15 ML | Refills: 0 | Status: SHIPPED | OUTPATIENT
Start: 2024-07-29

## 2024-07-29 RX ORDER — CEPHALEXIN 250 MG/1
250 CAPSULE ORAL 4 TIMES DAILY
Qty: 40 CAPSULE | Refills: 0 | Status: SHIPPED | OUTPATIENT
Start: 2024-07-29 | End: 2024-08-08

## 2024-07-29 NOTE — PROGRESS NOTES
Chief Complaint   Patient presents with    Uncontrolled type 2 diabetes mellitus with hyperglycemia        Mariola Beck is a 64 y.o. female had concerns including Uncontrolled type 2 diabetes mellitus with hyperglycemia.    T2DM.    She reports that she has a wound that started on her right foot second digit about a week ago.  She has been using peroxide and triple antibiotic ointment on this.  She has not seen anyone to have it evaluated medically.    Diabetes:  She is checking blood sugars frequently with Dexcom CGM. She has not been using this the last couple of days, due to not reading appropriately.  Current medications for diabetes include Mounjaro 10 mg weekly, insulin via an Omnipod 5 insulin pump, Metformin 1,000 mg BID.   She is on statin therapy.  Hypos: rarely    Is getting injections in the left eye for diabetic retinopathy.  She has a history of UTI and yeast infection.  She has not been following a diet/exercise program.  She is doing well since starting on her insulin pump.    The following portions of the patient's history were reviewed and updated as appropriate: allergies, current medications, past family history, past medical history, past social history, past surgical history and problem list.      Review of Systems   Constitutional:  Positive for fatigue.   Respiratory:  Positive for shortness of breath.    Endocrine: Positive for polydipsia. Negative for polyuria.   Skin:         Wound to right foot second digit.   All other systems reviewed and are negative.       Physical Exam  Vitals reviewed.   Constitutional:       Appearance: Normal appearance.   Cardiovascular:      Rate and Rhythm: Normal rate.   Pulmonary:      Effort: Pulmonary effort is normal.   Feet:      Comments: Wound noted to right foot second digit.  Red, swelling, drainage.  Open area noted near nailbed going around bottom of toe.  Skin:     General: Skin is warm.   Neurological:      General: No focal deficit present.      " Mental Status: She is alert and oriented to person, place, and time.   Psychiatric:         Mood and Affect: Mood normal.         Behavior: Behavior normal.         Thought Content: Thought content normal.         Judgment: Judgment normal.        /71 (BP Location: Right arm, Patient Position: Sitting, Cuff Size: Adult)   Pulse 87   Ht 160 cm (63\")   Wt 88.9 kg (196 lb)   LMP  (LMP Unknown)   SpO2 96%   BMI 34.72 kg/m²      Labs and imaging    CMP:     Lipid Panel:  No results found for: \"CHOL\", \"TRIG\", \"HDL\", \"VLDL\", \"LDL\"  HbA1c:  Lab Results   Component Value Date    HGBA1C 6.5 (A) 12/21/2023    HGBA1C 9.5 (A) 06/07/2023   6.6 (01/2024)  7.1 (06/01/2024)    Glucose:  Lab Results   Component Value Date    POCGLU 107 12/21/2023     Microalbumin:  Lab Results   Component Value Date    MALBCRERATIO 8.9 12/21/2023     TSH:  No results found for: \"TSH\"    Assessment and plan  Diagnoses and all orders for this visit:    1. Uncontrolled type 2 diabetes mellitus with hyperglycemia (Primary)  Assessment & Plan:  -Diabetes is stable with A1c 7.1.  -Continue Mounjaro 10 mg weekly. Patient has no personal history of pancreatitis, no family history of MEN syndrome or medullary thyroid cancer. Possible side effects including nausea, bloating, other GI upset and rarely pancreatitis were discussed. She was advised to call the office with any symptoms or concerns.   -Discussed continued use of BG's regularly.  Continue using CGM.  2 week data download is as follows:  Very high: 0%  High: 9%  In range: 91%  Low: 0%  Very low: 0%  Trend shows overall regulated BGs with some mild post supper hyperglycemia.  -Continue with current insulin pump settings.  -Continue Metformin 1,000 mg BID.  -S/S hypoglycemia discussed with Rule of 15's advised.  -Will reassess in 3 months.      2. Diabetic ulcer of toe of right foot associated with type 2 diabetes mellitus, limited to breakdown of skin  Assessment & Plan:  Discussed keeping " wound clean, dry and covered.  Will send in Keflex for patient to start.  She is scheduled to see Dr. Morel, podiatry on Wednesday at 8 AM.  Is to follow with this appointment and all follow-ups to care for this wound.  Follow-up as needed.      Other orders  -     cephalexin (KEFLEX) 250 MG capsule; Take 1 capsule by mouth 4 (Four) Times a Day for 10 days.  Dispense: 40 capsule; Refill: 0             Return in about 3 months (around 10/29/2024) for Follow-up appointment, A1C. The patient was instructed to contact the clinic with any interval questions or concerns.    This document has been electronically signed by KHURRAM Maldonado  July 29, 2024 16:21 EDT  Endocrinology    Please note that portions of this document were completed with a voice recognition program. Efforts were made to edit the dictations, but occasionally words are mis-transcribed.

## 2024-07-29 NOTE — ASSESSMENT & PLAN NOTE
Discussed keeping wound clean, dry and covered.  Will send in Keflex for patient to start.  She is scheduled to see Dr. Morel, podiatry on Wednesday at 8 AM.  Is to follow with this appointment and all follow-ups to care for this wound.  Follow-up as needed.

## 2024-07-29 NOTE — ASSESSMENT & PLAN NOTE
-Diabetes is stable with A1c 7.1.  -Continue Mounjaro 10 mg weekly. Patient has no personal history of pancreatitis, no family history of MEN syndrome or medullary thyroid cancer. Possible side effects including nausea, bloating, other GI upset and rarely pancreatitis were discussed. She was advised to call the office with any symptoms or concerns.   -Discussed continued use of BG's regularly.  Continue using CGM.  2 week data download is as follows:  Very high: 0%  High: 9%  In range: 91%  Low: 0%  Very low: 0%  Trend shows overall regulated BGs with some mild post supper hyperglycemia.  -Continue with current insulin pump settings.  -Continue Metformin 1,000 mg BID.  -S/S hypoglycemia discussed with Rule of 15's advised.  -Will reassess in 3 months.

## 2024-07-29 NOTE — PROGRESS NOTES
Specialty Pharmacy Patient Management Program  Endocrinology Reassessment     Mariola Beck is a 64 y.o. female with Type 2 Diabetes seen by an Endocrinology provider and enrolled in the Endocrinology Patient Management program offered by [facility].  A follow-up outreach was conducted, including assessment of continued therapy appropriateness, medication adherence, and side effect incidence and management for Mounjaro, Humalog, Omnipod, and Metformin.     Patient is currently taking Mounjaro 12.5 mg weekly which is currently on manufacture backorder. Patient and provider are agreeable to decreasing to Mounjaro 10 mg weekly. Patient denies low BG < 70 mg/dL. She denies any issues or any missed doses at this time.     Patient denies personal/family history of thyroid cancer and denies issues with pancreas/pancreatitis.     Changes to Insurance Coverage or Financial Support  None    Relevant Past Medical History and Comorbidities  Relevant medical history and concomitant health conditions were discussed with the patient. The patient's chart has been reviewed for relevant past medical history and comorbid health conditions and updated as necessary.   Past Medical History:   Diagnosis Date    Asthma     Chronic headaches     COPD (chronic obstructive pulmonary disease)     H/O bladder infections     Heart attack     Heart disease     Hypertension     Type 2 diabetes mellitus      Social History     Socioeconomic History    Marital status:    Tobacco Use    Smoking status: Former     Current packs/day: 1.00     Types: Cigarettes    Smokeless tobacco: Never   Vaping Use    Vaping status: Never Used   Substance and Sexual Activity    Alcohol use: Not Currently    Drug use: Never    Sexual activity: Defer       Problem list reviewed by Daya Ramires, PharmD on 7/29/2024 at  3:59 PM    Allergies  Known allergies and reactions were discussed with the patient. The patient's chart has  "been reviewed for allergy information and updated as necessary.   Plavix [clopidogrel]         Relevant Laboratory Values  A1C Last 3 Results          12/21/2023    11:15   HGBA1C Last 3 Results   Hemoglobin A1C 6.5      Lab Results   Component Value Date    HGBA1C 6.5 (A) 12/21/2023     No results found for: \"GLUCOSE\", \"CALCIUM\", \"NA\", \"K\", \"CO2\", \"CL\", \"BUN\", \"CREATININE\", \"EGFRIFAFRI\", \"EGFRIFNONA\", \"BCR\", \"ANIONGAP\"  No results found for: \"CHOL\", \"CHLPL\", \"TRIG\", \"HDL\", \"LDL\", \"LDLDIRECT\"  Microalbumin          12/21/2023    11:52   Microalbumin   Microalbumin, Urine 1.6        Current Medication List  This medication list has been reviewed with the patient and evaluated for any interactions or necessary modifications/recommendations, and updated to include all prescription medications, OTC medications, and supplements the patient is currently taking.  This list reflects what is contained in the patient's profile, which has also been marked as reviewed to communicate to other providers it is the most up to date version of the patient's current medication therapy.     Current Outpatient Medications:     Tirzepatide (MOUNJARO) 10 MG/0.5ML solution pen-injector pen, Inject 0.5 mL under the skin into the appropriate area as directed 1 (One) Time Per Week., Disp: 2 mL, Rfl: 5    amLODIPine (NORVASC) 2.5 MG tablet, TAKE 1 TABLET BY MOUTH 1 TIME EACH DAY., Disp: , Rfl:     aspirin 81 MG EC tablet, Take 1 tablet by mouth Daily., Disp: , Rfl:     Berberine Chloride (BERBERINE HCI PO), Take 2 tablets by mouth Daily. (Patient not taking: Reported on 7/29/2024), Disp: , Rfl:     Calcium Carbonate (CALCIUM 600 PO), Take 600 mg by mouth Daily., Disp: , Rfl:     coenzyme Q10 100 MG capsule, Take 1 capsule by mouth Daily., Disp: , Rfl:     Continuous Blood Gluc  (Dexcom G6 ) device, 1 each Continuous., Disp: 1 each, Rfl: 0    Continuous Glucose Sensor (Dexcom G6 Sensor), Change sensor every 10 (Ten) Days., Disp: " "3 each, Rfl: 5    Continuous Glucose Transmitter (Dexcom G6 Transmitter) misc, Change transmitter Every 3 (Three) Months., Disp: 1 each, Rfl: 1    Cyanocobalamin (B-12) 1000 MCG sublingual tablet, Take 1 tablet by mouth Daily., Disp: , Rfl:     insulin degludec (Tresiba FlexTouch) 100 UNIT/ML solution pen-injector injection, Inject 60 Units under the skin into the appropriate area as directed Daily. Only use in case of pump failure, Disp: 15 mL, Rfl: 0    Insulin Disposable Pump (Omnipod 5 G6 Pods, Gen 5,) misc, Use 1 pod Every Other Day., Disp: 15 each, Rfl: 5    Insulin Lispro (HumaLOG) 100 UNIT/ML injection, For use in insulin pump.  Maximum Daily Dose: 200 units, Disp: 60 mL, Rfl: 5    Insulin Pen Needle (Pen Needles) 32G X 4 MM misc, Use to inject insulin up to 4 times daily as directed, Disp: 100 each, Rfl: 5    Insulin Syringe 31G X 5/16\" 1 ML misc, To administer insulin BID., Disp: 100 each, Rfl: 2    Magnesium 300 MG capsule, Take 300 mg by mouth 2 (Two) Times a Day., Disp: , Rfl:     metFORMIN (GLUCOPHAGE) 1000 MG tablet, Take 1 tablet by mouth 2 (Two) Times a Day., Disp: 60 tablet, Rfl: 5    metoprolol tartrate (LOPRESSOR) 25 MG tablet, Take 1 tablet by mouth 2 (Two) Times a Day., Disp: , Rfl:     montelukast (SINGULAIR) 10 MG tablet, Take 1 tablet by mouth Daily., Disp: , Rfl:     Multiple Vitamins-Minerals (ICAPS AREDS 2 PO), Take 2 capsules by mouth 2 (Two) Times a Day., Disp: , Rfl:     nitroglycerin (NITROSTAT) 0.4 MG SL tablet, DISSOLVE 1 TABLET UNDER THE TONGUE AS NEEDED, Disp: , Rfl:     Omega-3 Fatty Acids (Odorless Coated Fish Oil) 1000 MG capsule delayed-release, 2 (two) times a day., Disp: , Rfl:     pantoprazole (PROTONIX) 40 MG EC tablet, Take 1 tablet by mouth Daily., Disp: , Rfl:     prasugrel (EFFIENT) 10 MG tablet, Take 1 tablet by mouth Daily., Disp: , Rfl:     rOPINIRole (REQUIP) 0.25 MG tablet, Take 1 tablet by mouth Daily., Disp: , Rfl:     rosuvastatin (CRESTOR) 20 MG tablet, Take 1 " tablet by mouth Every Evening., Disp: , Rfl:     vitamin D3 125 MCG (5000 UT) capsule capsule, Take 1 capsule by mouth Daily., Disp: , Rfl:     vitamin E 100 UNIT capsule, Take 1 capsule by mouth Daily., Disp: , Rfl:     Medicines reviewed by Daya Ramires, PharmD on 7/29/2024 at  3:51 PM    Drug Interactions  The hypoglycemic effect of Insulin may be increased or prolonged by metoprolol. Minor cardiovascular abnormalities may occur during hypoglycemic episodes.    Recommended Medications Assessment  Aspirin - Currently Taking  Statin - Currently Taking  ACEi/ARB - Not Indicated    Adverse Drug Reactions  Adverse Reactions Experienced: None   Plan for ADR Management: Not Required    Hospitalizations and Urgent Care Since Last Assessment  Hospitalizations or Admissions: None  ED Visits: None   Urgent Office Visits: None    Adherence and Self-Administration  Adherence related patient's specialty therapy was discussed with the patient. The Adherence segment of this outreach has been reviewed and updated.     Ongoing or New Barriers to Patient Adherence and/or Self-Administration: None  Methods for Supporting Patient Adherence and/or Self-Administration: None Required    Goals of Therapy  Goals related to the patient's specialty therapy was discussed with the patient. The Patient Goals segment of this outreach has been reviewed and updated.    Goals Addressed Today        Specialty Pharmacy General Goal      Prevent hypoglycemia            Quality of Life Assessment  Quality of Life Improvement Scale: 10-Significantly better  Reassessment Plan & Follow-Up  Patient's diabetes remains at goal with A1C of 7.1%.   Medication Therapy Changes: Per KHURRAM Maldonado  Will order Mounjaro 10 mg weekly   Will order Tresiba 60 units daily in case of pump failure   Additional Plans, Therapy Recommendations, or Therapy Problems to Be Addressed: [facility]   Pharmacist to  perform regular reassessments no more than (6) months from the previous assessment.  Care Coordinator to set up future refill outreaches, coordinate prescription delivery, and escalate clinical questions to pharmacist.      Attestation  I attest the patient was actively involved in and has agreed to the above plan of care. If the prescribed therapy is at any point deemed not appropriate based on the current or future assessments, a consultation will be initiated with the patient's specialty care provider to determine the best course of action. The revised plan of therapy will be documented along with any required assessments and/or additional patient education provided.     Daya Ramires, PharmD  Clinical Specialty Pharmacist, Endocrinology   7/29/2024  16:00 EDT

## 2024-08-19 ENCOUNTER — SPECIALTY PHARMACY (OUTPATIENT)
Dept: PHARMACY | Facility: HOSPITAL | Age: 64
End: 2024-08-19
Payer: COMMERCIAL

## 2024-08-19 NOTE — PROGRESS NOTES
Specialty Pharmacy Refill Coordination Note     Mariola is a 64 y.o. female contacted today regarding refills of  Dexcom transmitter, humalog, metformin, mounjaro, omnipod  specialty medication(s).    Reviewed and verified with patient:       Specialty medication(s) and dose(s) confirmed: yes    Refill Questions      Flowsheet Row Most Recent Value   Changes to allergies? No   Changes to medications? No   New conditions or infections since last clinic visit No   Unplanned office visit, urgent care, ED, or hospital admission in the last 4 weeks  No   How does patient/caregiver feel medication is working? Very good   Financial problems or insurance changes  No   Since the previous refill, were any specialty medication doses or scheduled injections missed or delayed?  No   Does this patient require a clinical escalation to a pharmacist? No            Delivery Questions      Flowsheet Row Most Recent Value   Delivery method [employer]Ex   Delivery address verified with patient/caregiver? Yes   Delivery address Home   Number of medications in delivery 5   Medication(s) being filled and delivered Continuous Glucose Sensor, Insulin Lispro, Metformin Hcl (Biguanides), Tirzepatide, Insulin Disposable Pump   Doses left of specialty medications na   Copay verified? Yes   Copay amount 35.00   Copay form of payment Credit/debit on file   Ship Date 08/20   Delivery Date 08/21   Signature Required No                   Follow-up: 30 day(s)     Shweta Richardson, Pharmacy Technician  Specialty Pharmacy Technician

## 2024-09-12 ENCOUNTER — SPECIALTY PHARMACY (OUTPATIENT)
Dept: PHARMACY | Facility: HOSPITAL | Age: 64
End: 2024-09-12
Payer: COMMERCIAL

## 2024-09-12 DIAGNOSIS — E11.65 UNCONTROLLED TYPE 2 DIABETES MELLITUS WITH HYPERGLYCEMIA: ICD-10-CM

## 2024-09-12 RX ORDER — INSULIN LISPRO 100 [IU]/ML
INJECTION, SOLUTION INTRAVENOUS; SUBCUTANEOUS
Qty: 60 ML | Refills: 5 | Status: SHIPPED | OUTPATIENT
Start: 2024-09-12

## 2024-09-12 RX ORDER — PROCHLORPERAZINE 25 MG/1
1 SUPPOSITORY RECTAL
Qty: 1 EACH | Refills: 1 | Status: SHIPPED | OUTPATIENT
Start: 2024-09-12

## 2024-09-12 RX ORDER — INSULIN PMP CART,AUT,G6/7,CNTR
1 EACH SUBCUTANEOUS EVERY OTHER DAY
Qty: 15 EACH | Refills: 5 | Status: SHIPPED | OUTPATIENT
Start: 2024-09-12

## 2024-09-12 RX ORDER — PROCHLORPERAZINE 25 MG/1
SUPPOSITORY RECTAL
Qty: 3 EACH | Refills: 5 | Status: SHIPPED | OUTPATIENT
Start: 2024-09-12

## 2024-09-12 RX ORDER — INSULIN DEGLUDEC 100 U/ML
60 INJECTION, SOLUTION SUBCUTANEOUS DAILY
Qty: 15 ML | Refills: 0 | Status: CANCELLED | OUTPATIENT
Start: 2024-09-12

## 2024-09-12 NOTE — PROGRESS NOTES
Specialty Pharmacy Patient Management Program  Endocrinology Reassessment     Mariola Beck is a 64 y.o. female with Type 2 Diabetes seen by an Endocrinology provider and enrolled in the Endocrinology Patient Management program offered by [facility].  A follow-up outreach was conducted, including assessment of continued therapy appropriateness, medication adherence, and side effect incidence and management for Mounjaro, Humalog, Omnipod, and Metformin.     Patient is currently taking Mounjaro 10 mg weekly, metformin 1,000 mg BID, and Humalog in her Omnipod 5 insulin pump. She uses Dexcom G6 to monitor BG with readings in range 85-83% of the time. Patient denies recurring low BG < 70 mg/dL. She denies any issues or any missed doses at this time.  She needs refills for diabetes medications today.     Patient denies personal/family history of thyroid cancer and denies issues with pancreas/pancreatitis.     Changes to Insurance Coverage or Financial Support  None    Relevant Past Medical History and Comorbidities  Relevant medical history and concomitant health conditions were discussed with the patient. The patient's chart has been reviewed for relevant past medical history and comorbid health conditions and updated as necessary.   Past Medical History:   Diagnosis Date    Asthma     Chronic headaches     COPD (chronic obstructive pulmonary disease)     H/O bladder infections     Heart attack     Heart disease     Hypertension     Type 2 diabetes mellitus      Social History     Socioeconomic History    Marital status:    Tobacco Use    Smoking status: Former     Current packs/day: 1.00     Types: Cigarettes    Smokeless tobacco: Never   Vaping Use    Vaping status: Never Used   Substance and Sexual Activity    Alcohol use: Not Currently    Drug use: Never    Sexual activity: Defer       Problem list reviewed by Felisa Mendez, PharmD on 9/12/2024 at  2:04 PM    Allergies  Known  "allergies and reactions were discussed with the patient. The patient's chart has been reviewed for allergy information and updated as necessary.   Plavix [clopidogrel]    Allergies reviewed by Felisa Mendez, PharmD on 9/12/2024 at  2:04 PM    Relevant Laboratory Values  A1C Last 3 Results          12/21/2023    11:15   HGBA1C Last 3 Results   Hemoglobin A1C 6.5      Lab Results   Component Value Date    HGBA1C 6.5 (A) 12/21/2023     No results found for: \"GLUCOSE\", \"CALCIUM\", \"NA\", \"K\", \"CO2\", \"CL\", \"BUN\", \"CREATININE\", \"EGFRIFAFRI\", \"EGFRIFNONA\", \"BCR\", \"ANIONGAP\"  No results found for: \"CHOL\", \"CHLPL\", \"TRIG\", \"HDL\", \"LDL\", \"LDLDIRECT\"  Microalbumin          12/21/2023    11:52   Microalbumin   Microalbumin, Urine 1.6        Current Medication List  This medication list has been reviewed with the patient and evaluated for any interactions or necessary modifications/recommendations, and updated to include all prescription medications, OTC medications, and supplements the patient is currently taking.  This list reflects what is contained in the patient's profile, which has also been marked as reviewed to communicate to other providers it is the most up to date version of the patient's current medication therapy.     Current Outpatient Medications:     amLODIPine (NORVASC) 2.5 MG tablet, TAKE 1 TABLET BY MOUTH 1 TIME EACH DAY., Disp: , Rfl:     aspirin 81 MG EC tablet, Take 1 tablet by mouth Daily., Disp: , Rfl:     Berberine Chloride (BERBERINE HCI PO), Take 2 tablets by mouth Daily., Disp: , Rfl:     Calcium Carbonate (CALCIUM 600 PO), Take 600 mg by mouth Daily., Disp: , Rfl:     coenzyme Q10 100 MG capsule, Take 1 capsule by mouth Daily., Disp: , Rfl:     Continuous Blood Gluc  (Dexcom G6 ) device, 1 each Continuous., Disp: 1 each, Rfl: 0    Continuous Glucose Sensor (Dexcom G6 Sensor), Change sensor every 10 (Ten) Days., Disp: 3 each, Rfl: 5    Continuous Glucose Transmitter (Dexcom G6 " "Transmitter) misc, Change transmitter Every 3 (Three) Months., Disp: 1 each, Rfl: 1    Cyanocobalamin (B-12) 1000 MCG sublingual tablet, Take 1 tablet by mouth Daily., Disp: , Rfl:     Insulin Disposable Pump (Omnipod 5 G6 Pods, Gen 5,) misc, Use 1 pod Every Other Day., Disp: 15 each, Rfl: 5    Insulin Lispro (HumaLOG) 100 UNIT/ML injection, For use in insulin pump.  Maximum Daily Dose: 200 units, Disp: 60 mL, Rfl: 5    Insulin Pen Needle (Pen Needles) 32G X 4 MM misc, Use to inject insulin up to 4 times daily as directed, Disp: 100 each, Rfl: 5    Insulin Syringe 31G X 5/16\" 1 ML misc, To administer insulin BID., Disp: 100 each, Rfl: 2    Magnesium 300 MG capsule, Take 300 mg by mouth 2 (Two) Times a Day., Disp: , Rfl:     metFORMIN (GLUCOPHAGE) 1000 MG tablet, Take 1 tablet by mouth 2 (Two) Times a Day., Disp: 60 tablet, Rfl: 5    metoprolol tartrate (LOPRESSOR) 25 MG tablet, Take 1 tablet by mouth 2 (Two) Times a Day., Disp: , Rfl:     montelukast (SINGULAIR) 10 MG tablet, Take 1 tablet by mouth Daily., Disp: , Rfl:     Multiple Vitamins-Minerals (ICAPS AREDS 2 PO), Take 2 capsules by mouth 2 (Two) Times a Day., Disp: , Rfl:     nitroglycerin (NITROSTAT) 0.4 MG SL tablet, DISSOLVE 1 TABLET UNDER THE TONGUE AS NEEDED, Disp: , Rfl:     Omega-3 Fatty Acids (Odorless Coated Fish Oil) 1000 MG capsule delayed-release, 2 (two) times a day., Disp: , Rfl:     pantoprazole (PROTONIX) 40 MG EC tablet, Take 1 tablet by mouth Daily., Disp: , Rfl:     prasugrel (EFFIENT) 10 MG tablet, Take 1 tablet by mouth Daily., Disp: , Rfl:     rOPINIRole (REQUIP) 0.25 MG tablet, Take 1 tablet by mouth Daily., Disp: , Rfl:     rosuvastatin (CRESTOR) 20 MG tablet, Take 1 tablet by mouth Every Evening., Disp: , Rfl:     Tirzepatide (MOUNJARO) 10 MG/0.5ML solution pen-injector pen, Inject 0.5 mL under the skin into the appropriate area as directed 1 (One) Time Per Week., Disp: 2 mL, Rfl: 5    vitamin D3 125 MCG (5000 UT) capsule capsule, Take " 1 capsule by mouth Daily., Disp: , Rfl:     vitamin E 100 UNIT capsule, Take 1 capsule by mouth Daily., Disp: , Rfl:     insulin degludec (Tresiba FlexTouch) 100 UNIT/ML solution pen-injector injection, Inject 60 Units under the skin into the appropriate area as directed Daily. Only use in case of pump failure (Patient not taking: Reported on 9/12/2024), Disp: 15 mL, Rfl: 0    Medicines reviewed by Felisa Mendez, PharmD on 9/12/2024 at  2:07 PM    Drug Interactions  The hypoglycemic effect of Insulin may be increased or prolonged by metoprolol. Minor cardiovascular abnormalities may occur during hypoglycemic episodes.    Recommended Medications Assessment  Aspirin - Currently Taking  Statin - Currently Taking  ACEi/ARB - Not Indicated    Adverse Drug Reactions  Adverse Reactions Experienced: None   Plan for ADR Management: Not Required    Hospitalizations and Urgent Care Since Last Assessment  Hospitalizations or Admissions: None  ED Visits: None   Urgent Office Visits: None    Adherence and Self-Administration  Adherence related patient's specialty therapy was discussed with the patient. The Adherence segment of this outreach has been reviewed and updated.     Ongoing or New Barriers to Patient Adherence and/or Self-Administration: None  Methods for Supporting Patient Adherence and/or Self-Administration: None Required    Goals of Therapy  Goals related to the patient's specialty therapy was discussed with the patient. The Patient Goals segment of this outreach has been reviewed and updated.    Goals Addressed Today        Consistently take medications as prescribed      Specialty Pharmacy General Goal      Minimize hypoglycemia            Quality of Life Assessment   Quality of Life related to the patient's specialty therapy was discussed with the patient. The QOL segment of this outreach has been reviewed and updated.    Quality of Life Assessment  Quality of Life Improvement Scale: 10-Significantly  better    Reassessment Plan & Follow-Up  Medication Therapy Changes: Per Sheryl Yip, APRN will order refills for   Metformin 1,000 mg BID   Humalog for use in insulin pump  Dexcom sensors  Additional Plans, Therapy Recommendations, or Therapy Problems to Be Addressed: [facility].   Pharmacist to perform regular reassessments no more than (6) months from the previous assessment.  Care Coordinator to set up future refill outreaches, coordinate prescription delivery, and escalate clinical questions to pharmacist.      Attestation  I attest the patient was actively involved in and has agreed to the above plan of care. If the prescribed therapy is at any point deemed not appropriate based on the current or future assessments, a consultation will be initiated with the patient's specialty care provider to determine the best course of action. The revised plan of therapy will be documented along with any required assessments and/or additional patient education provided.     Felisa Mendez, PharmD, [facility]OLMAN  Clinical Specialty Pharmacist, Endocrinology   9/12/2024  14:18 EDT

## 2024-10-07 ENCOUNTER — SPECIALTY PHARMACY (OUTPATIENT)
Dept: PHARMACY | Facility: HOSPITAL | Age: 64
End: 2024-10-07
Payer: COMMERCIAL

## 2024-10-07 NOTE — PROGRESS NOTES
Specialty Pharmacy Refill Coordination Note     Mariola is a 64 y.o. female contacted today regarding refills of  Dexcom sensor, Humalog, metformin, Mounjaro, omnipod specialty medication(s).    Reviewed and verified with patient:       Specialty medication(s) and dose(s) confirmed: yes    Refill Questions      Flowsheet Row Most Recent Value   Changes to allergies? No   Changes to medications? No   New conditions or infections since last clinic visit No   Unplanned office visit, urgent care, ED, or hospital admission in the last 4 weeks  No   How does patient/caregiver feel medication is working? Very good   Financial problems or insurance changes  No   Since the previous refill, were any specialty medication doses or scheduled injections missed or delayed?  No   Does this patient require a clinical escalation to a pharmacist? No            Delivery Questions      Flowsheet Row Most Recent Value   Delivery method [employer]Ex   Delivery address verified with patient/caregiver? Yes   Delivery address Home   Number of medications in delivery 5   Medication(s) being filled and delivered Insulin Lispro, Metformin Hcl (Biguanides), Continuous Glucose Sensor, Insulin Disposable Pump, Tirzepatide   Doses left of specialty medications na   Copay verified? No   Copay amount na   Copay form of payment No copayment ($0)   Ship Date 10/08   Delivery Date 10/09   Signature Required No                   Follow-up: 30 day(s)     Shweta Richardson, Pharmacy Technician  Specialty Pharmacy Technician

## 2024-10-31 ENCOUNTER — OFFICE VISIT (OUTPATIENT)
Dept: ENDOCRINOLOGY | Facility: CLINIC | Age: 64
End: 2024-10-31
Payer: COMMERCIAL

## 2024-10-31 ENCOUNTER — SPECIALTY PHARMACY (OUTPATIENT)
Dept: PHARMACY | Facility: HOSPITAL | Age: 64
End: 2024-10-31
Payer: COMMERCIAL

## 2024-10-31 VITALS
OXYGEN SATURATION: 95 % | HEART RATE: 76 BPM | WEIGHT: 200 LBS | BODY MASS INDEX: 35.43 KG/M2 | DIASTOLIC BLOOD PRESSURE: 45 MMHG | SYSTOLIC BLOOD PRESSURE: 111 MMHG

## 2024-10-31 DIAGNOSIS — E11.65 UNCONTROLLED TYPE 2 DIABETES MELLITUS WITH HYPERGLYCEMIA: Primary | ICD-10-CM

## 2024-10-31 DIAGNOSIS — E11.65 UNCONTROLLED TYPE 2 DIABETES MELLITUS WITH HYPERGLYCEMIA: ICD-10-CM

## 2024-10-31 LAB
EXPIRATION DATE: ABNORMAL
HBA1C MFR BLD: 6.6 % (ref 4.5–5.7)
Lab: ABNORMAL

## 2024-10-31 RX ORDER — INSULIN PMP CART,AUT,G6/7,CNTR
1 EACH SUBCUTANEOUS EVERY OTHER DAY
Qty: 15 EACH | Refills: 5 | Status: SHIPPED | OUTPATIENT
Start: 2024-10-31

## 2024-10-31 RX ORDER — PROCHLORPERAZINE 25 MG/1
1 SUPPOSITORY RECTAL
Qty: 1 EACH | Refills: 1 | Status: SHIPPED | OUTPATIENT
Start: 2024-10-31

## 2024-10-31 RX ORDER — ACETAMINOPHEN 325 MG/1
650 TABLET ORAL EVERY 6 HOURS PRN
COMMUNITY

## 2024-10-31 RX ORDER — INSULIN LISPRO 100 [IU]/ML
INJECTION, SOLUTION INTRAVENOUS; SUBCUTANEOUS
Qty: 60 ML | Refills: 5 | Status: SHIPPED | OUTPATIENT
Start: 2024-10-31

## 2024-10-31 RX ORDER — PROCHLORPERAZINE 25 MG/1
SUPPOSITORY RECTAL
Qty: 3 EACH | Refills: 5 | Status: SHIPPED | OUTPATIENT
Start: 2024-10-31

## 2024-10-31 NOTE — PROGRESS NOTES
Specialty Pharmacy Patient Management Program  Endocrinology Reassessment     Mariola Beck is a 64 y.o. female with Type 2 Diabetes seen by an Endocrinology provider and enrolled in the Endocrinology Patient Management program offered by [facility].  A follow-up outreach was conducted, including assessment of continued therapy appropriateness, medication adherence, and side effect incidence and management for Mounjaro, Humalog, Omnipod, and Metformin.     Patient is currently taking Mounjaro 10 mg weekly, metformin 1,000 mg BID, and Humalog in her Omnipod 5 insulin pump. She uses Dexcom G6 to monitor BG with readings between 90s-180s. Patient denies recurring low BG < 70 mg/dL. She denies any issues or any missed doses at this time.  She needs refills for diabetes medications today.     Patient denies personal/family history of thyroid cancer and denies issues with pancreas/pancreatitis.    Changes to Insurance Coverage or Financial Support  None    Relevant Past Medical History and Comorbidities  Relevant medical history and concomitant health conditions were discussed with the patient. The patient's chart has been reviewed for relevant past medical history and comorbid health conditions and updated as necessary.   Past Medical History:   Diagnosis Date    Asthma     Chronic headaches     COPD (chronic obstructive pulmonary disease)     H/O bladder infections     Heart attack     Heart disease     Hypertension     Type 2 diabetes mellitus      Social History     Socioeconomic History    Marital status:    Tobacco Use    Smoking status: Former     Current packs/day: 1.00     Types: Cigarettes    Smokeless tobacco: Never   Vaping Use    Vaping status: Never Used   Substance and Sexual Activity    Alcohol use: Not Currently    Drug use: Never    Sexual activity: Defer       Problem list reviewed by Yin Gao RP on 10/31/2024 at  3:42 PM  Allergies  Known allergies and  "reactions were discussed with the patient. The patient's chart has been reviewed for allergy information and updated as necessary.   Plavix [clopidogrel]    Allergies reviewed by Yin Gao RP on 10/31/2024 at  3:52 PM  Relevant Laboratory Values  A1C Last 3 Results          12/21/2023    11:15 10/31/2024    15:45   HGBA1C Last 3 Results   Hemoglobin A1C 6.5  6.6      Lab Results   Component Value Date    HGBA1C 6.6 (A) 10/31/2024     No results found for: \"GLUCOSE\", \"CALCIUM\", \"NA\", \"K\", \"CO2\", \"CL\", \"BUN\", \"CREATININE\", \"EGFRIFAFRI\", \"EGFRIFNONA\", \"BCR\", \"ANIONGAP\"  No results found for: \"CHOL\", \"CHLPL\", \"TRIG\", \"HDL\", \"LDL\", \"LDLDIRECT\"  Microalbumin          12/21/2023    11:52   Microalbumin   Microalbumin, Urine 1.6        Current Medication List  This medication list has been reviewed with the patient and evaluated for any interactions or necessary modifications/recommendations, and updated to include all prescription medications, OTC medications, and supplements the patient is currently taking.  This list reflects what is contained in the patient's profile, which has also been marked as reviewed to communicate to other providers it is the most up to date version of the patient's current medication therapy.     Current Outpatient Medications:     aspirin 81 MG EC tablet, Take 1 tablet by mouth Daily., Disp: , Rfl:     Berberine Chloride (BERBERINE HCI PO), Take 2 tablets by mouth Daily., Disp: , Rfl:     Calcium Carbonate (CALCIUM 600 PO), Take 600 mg by mouth Daily., Disp: , Rfl:     coenzyme Q10 100 MG capsule, Take 1 capsule by mouth Daily., Disp: , Rfl:     Continuous Blood Gluc  (Dexcom G6 ) device, 1 each Continuous., Disp: 1 each, Rfl: 0    Continuous Glucose Sensor (Dexcom G6 Sensor), Change sensor every 10 (Ten) Days., Disp: 3 each, Rfl: 5    Continuous Glucose Transmitter (Dexcom G6 Transmitter) misc, Change transmitter Every 3 (Three) Months., Disp: 1 each, Rfl: 1    " "Cyanocobalamin (B-12) 1000 MCG sublingual tablet, Take 1 tablet by mouth Daily., Disp: , Rfl:     Insulin Disposable Pump (Omnipod 5 [id number] Pods Gen 5) misc, Use 1 pod Every Other Day., Disp: 15 each, Rfl: 5    Insulin Lispro (HumaLOG) 100 UNIT/ML injection, For use in insulin pump.  Maximum Daily Dose: 200 units, Disp: 60 mL, Rfl: 5    Insulin Pen Needle (Pen Needles) 32G X 4 MM misc, Use to inject insulin up to 4 times daily as directed, Disp: 100 each, Rfl: 5    Insulin Syringe 31G X 5/16\" 1 ML misc, To administer insulin BID., Disp: 100 each, Rfl: 2    Magnesium 300 MG capsule, Take 300 mg by mouth 2 (Two) Times a Day., Disp: , Rfl:     metFORMIN (GLUCOPHAGE) 1000 MG tablet, Take 1 tablet by mouth 2 (Two) Times a Day., Disp: 60 tablet, Rfl: 5    metoprolol tartrate (LOPRESSOR) 25 MG tablet, Take 1 tablet by mouth 2 (Two) Times a Day., Disp: , Rfl:     montelukast (SINGULAIR) 10 MG tablet, Take 1 tablet by mouth Daily., Disp: , Rfl:     Multiple Vitamins-Minerals (ICAPS AREDS 2 PO), Take 2 capsules by mouth 2 (Two) Times a Day., Disp: , Rfl:     nitroglycerin (NITROSTAT) 0.4 MG SL tablet, DISSOLVE 1 TABLET UNDER THE TONGUE AS NEEDED, Disp: , Rfl:     Omega-3 Fatty Acids (Odorless Coated Fish Oil) 1000 MG capsule delayed-release, 2 (two) times a day., Disp: , Rfl:     pantoprazole (PROTONIX) 40 MG EC tablet, Take 1 tablet by mouth Daily., Disp: , Rfl:     prasugrel (EFFIENT) 10 MG tablet, Take 1 tablet by mouth Daily., Disp: , Rfl:     rosuvastatin (CRESTOR) 20 MG tablet, Take 1 tablet by mouth Every Evening., Disp: , Rfl:     vitamin D3 125 MCG (5000 UT) capsule capsule, Take 1 capsule by mouth Daily., Disp: , Rfl:     vitamin E 100 UNIT capsule, Take 1 capsule by mouth Daily., Disp: , Rfl:     acetaminophen (Tylenol) 325 MG tablet, Take 2 tablets by mouth Every 6 (Six) Hours As Needed for Mild Pain., Disp: , Rfl:     amLODIPine (NORVASC) 2.5 MG tablet, TAKE 1 TABLET BY MOUTH 1 TIME EACH DAY. (Patient not " taking: Reported on 10/31/2024), Disp: , Rfl:     Ferrous Sulfate (IRON PO), Take 1 tablet by mouth Daily., Disp: , Rfl:     insulin degludec (Tresiba FlexTouch) 100 UNIT/ML solution pen-injector injection, Inject 60 Units under the skin into the appropriate area as directed Daily. Only use in case of pump failure (Patient not taking: Reported on 10/31/2024), Disp: 15 mL, Rfl: 0    rOPINIRole (REQUIP) 0.25 MG tablet, Take 1 tablet by mouth Daily. (Patient not taking: Reported on 10/31/2024), Disp: , Rfl:     Tirzepatide 12.5 MG/0.5ML solution auto-injector, Inject 12.5 mg under the skin into the appropriate area as directed 1 (One) Time Per Week., Disp: 2 mL, Rfl: 5    Medicines reviewed by Yin Gao RPH on 10/31/2024 at  3:40 PM  Medicines reviewed by Yin Gao RPH on 10/31/2024 at  3:49 PM    Drug Interactions  The hypoglycemic effect of Insulin may be increased or prolonged by metoprolol. Minor cardiovascular abnormalities may occur during hypoglycemic episodes.    Recommended Medications Assessment  Aspirin - Currently Taking  Statin - Currently Taking  ACEi/ARB - Not Indicated    Adverse Drug Reactions  Adverse Reactions Experienced: None   Plan for ADR Management: Not Required    Hospitalizations and Urgent Care Since Last Assessment  Hospitalizations or Admissions: None  ED Visits: None   Urgent Office Visits: None    Adherence and Self-Administration  Adherence related patient's specialty therapy was discussed with the patient. The Adherence segment of this outreach has been reviewed and updated.     Ongoing or New Barriers to Patient Adherence and/or Self-Administration: None  Methods for Supporting Patient Adherence and/or Self-Administration: None Required    Goals of Therapy  Goals related to the patient's specialty therapy was discussed with the patient. The Patient Goals segment of this outreach has been reviewed and updated.    Goals Addressed Today        HEMOGLOBIN A1C < 7      Patient  is on tract because A1c is <7%       Specialty Pharmacy General Goal      Minimize hypoglycemia            Quality of Life Assessment   Quality of Life related to the patient's specialty therapy was discussed with the patient. The QOL segment of this outreach has been reviewed and updated.    Quality of Life Assessment  Quality of Life Improvement Scale: 8-Moderately better  Reassessment Plan & Follow-Up  Patient diabetes remains at goal with A1c of 6.6%  Medication Therapy Changes: Per Sheryl Yip, APRN will order refills for   Metformin 1,000 mg BID   Humalog for use in insulin pump  Dexcom sensors and omnipods  Increase to Mounjaro 12.5mg weekly  Additional Plans, Therapy Recommendations, or Therapy Problems to Be Addressed:  Will send updated RX's to [facility] for mail-out.  Pharmacist to perform regular reassessments no more than (6) months from the previous assessment.  Care Coordinator to set up future refill outreaches, coordinate prescription delivery, and escalate clinical questions to pharmacist.      Attestation  I attest the patient was actively involved in and has agreed to the above plan of care. If the prescribed therapy is at any point deemed not appropriate based on the current or future assessments, a consultation will be initiated with the patient's specialty care provider to determine the best course of action. The revised plan of therapy will be documented along with any required assessments and/or additional patient education provided.     Yin Gao RPH  16:05 EDT

## 2024-10-31 NOTE — PROGRESS NOTES
Chief Complaint   Patient presents with    Follow-up        Mariola Beck is a 64 y.o. female had concerns including Follow-up.    T2DM.    She reports that her foot wound is doing well.  She denies any issues with her diabetes at this time.  She denies overt hyper/hypos.    Diabetes:  She is checking blood sugars frequently with Dexcom CGM. She has not been using this the last couple of days, due to not reading appropriately.  Current medications for diabetes include Mounjaro 10 mg weekly, insulin via an Omnipod 5 insulin pump, Metformin 1,000 mg BID.   She is on statin therapy.  Hypos: rarely    Is getting injections in the left eye for diabetic retinopathy.  She has a history of UTI and yeast infection.  She has not been following a diet/exercise program.  She is doing well since starting on her insulin pump.    The following portions of the patient's history were reviewed and updated as appropriate: allergies, current medications, past family history, past medical history, past social history, past surgical history and problem list.      Review of Systems   Constitutional:  Positive for fatigue.   Respiratory:  Positive for shortness of breath.    Endocrine: Positive for polydipsia. Negative for polyuria.   Skin:         Wound to right foot second digit.   All other systems reviewed and are negative.       Physical Exam  Vitals reviewed.   Constitutional:       Appearance: Normal appearance.   Cardiovascular:      Rate and Rhythm: Normal rate.   Pulmonary:      Effort: Pulmonary effort is normal.   Feet:      Comments: Wound covered with dressing.  Skin:     General: Skin is warm.   Neurological:      General: No focal deficit present.      Mental Status: She is alert and oriented to person, place, and time.   Psychiatric:         Mood and Affect: Mood normal.         Behavior: Behavior normal.         Thought Content: Thought content normal.         Judgment: Judgment normal.        /45 (BP Location:  "Right arm, Patient Position: Sitting, Cuff Size: Adult)   Pulse 76   Wt 90.7 kg (200 lb)   LMP  (LMP Unknown)   SpO2 95%   BMI 35.43 kg/m²      Labs and imaging    CMP:     Lipid Panel:  No results found for: \"CHOL\", \"TRIG\", \"HDL\", \"VLDL\", \"LDL\"  HbA1c:  Lab Results   Component Value Date    HGBA1C 6.6 (A) 10/31/2024    HGBA1C 6.5 (A) 12/21/2023   6.6 (01/2024)  7.1 (06/01/2024)    Glucose:  Lab Results   Component Value Date    POCGLU 107 12/21/2023     Microalbumin:  Lab Results   Component Value Date    MALBCRERATIO 8.9 12/21/2023     TSH:  No results found for: \"TSH\"    Assessment and plan  Diagnoses and all orders for this visit:    1. Uncontrolled type 2 diabetes mellitus with hyperglycemia (Primary)  Assessment & Plan:  -Diabetes is stable with A1c 6.6.  -Continue Mounjaro 10 mg weekly. Patient has no personal history of pancreatitis, no family history of MEN syndrome or medullary thyroid cancer. Possible side effects including nausea, bloating, other GI upset and rarely pancreatitis were discussed. She was advised to call the office with any symptoms or concerns.   -Discussed continued use of BG's regularly.  Continue using CGM.  2 week data download is as follows:  Very high: 0%  High: 9%  In range: 91%  Low: 0%  Very low: 0%  Trend shows overall regulated BGs with some mild post supper hyperglycemia.  -Continue with current insulin pump settings.  -Continue Metformin 1,000 mg BID.  -S/S hypoglycemia discussed with Rule of 15's advised.  -Will reassess in 3 months.    Orders:  -     POC Glycosylated Hemoglobin (Hb A1C)             Return in about 3 months (around 1/31/2025) for Follow-up appointment, A1C. The patient was instructed to contact the clinic with any interval questions or concerns.    This document has been electronically signed by KHURRAM Maldonado  November 1, 2024 21:47 EDT  Endocrinology    Please note that portions of this document were completed with a voice recognition program. " Efforts were made to edit the dictations, but occasionally words are mis-transcribed.

## 2024-11-02 NOTE — ASSESSMENT & PLAN NOTE
-Diabetes is stable with A1c 6.6.  -Continue Mounjaro 10 mg weekly. Patient has no personal history of pancreatitis, no family history of MEN syndrome or medullary thyroid cancer. Possible side effects including nausea, bloating, other GI upset and rarely pancreatitis were discussed. She was advised to call the office with any symptoms or concerns.   -Discussed continued use of BG's regularly.  Continue using CGM.  2 week data download is as follows:  Very high: 0%  High: 9%  In range: 91%  Low: 0%  Very low: 0%  Trend shows overall regulated BGs with some mild post supper hyperglycemia.  -Continue with current insulin pump settings.  -Continue Metformin 1,000 mg BID.  -S/S hypoglycemia discussed with Rule of 15's advised.  -Will reassess in 3 months.

## 2024-11-25 ENCOUNTER — SPECIALTY PHARMACY (OUTPATIENT)
Dept: PHARMACY | Facility: HOSPITAL | Age: 64
End: 2024-11-25
Payer: COMMERCIAL

## 2024-11-25 NOTE — PROGRESS NOTES
Specialty Pharmacy Refill Coordination Note     Mariola is a 64 y.o. female contacted today regarding refills of  Dexcom sensor, humalog, metformin, mounjaro, omnipod specialty medication(s).    Reviewed and verified with patient:       Specialty medication(s) and dose(s) confirmed: yes    Refill Questions      Flowsheet Row Most Recent Value   Changes to allergies? No   Changes to medications? No   New conditions or infections since last clinic visit No   Unplanned office visit, urgent care, ED, or hospital admission in the last 4 weeks  No   How does patient/caregiver feel medication is working? Very good   Financial problems or insurance changes  No   Since the previous refill, were any specialty medication doses or scheduled injections missed or delayed?  No   Does this patient require a clinical escalation to a pharmacist? No            Delivery Questions      Flowsheet Row Most Recent Value   Delivery method UPS   Delivery address verified with patient/caregiver? Yes   Delivery address Home   Number of medications in delivery 5   Medication(s) being filled and delivered Continuous Glucose Sensor (Dexcom G6 Sensor), Insulin Disposable Pump (Omnipod 5 [id number] Pods Gen 5), metFORMIN HCl (GLUCOPHAGE), Insulin Lispro (humaLOG)   Doses left of specialty medications na   Copay verified? Yes   Copay amount 35.00$   Copay form of payment Credit/debit on file   Ship Date 11/25   Delivery Date 11/26   Signature Required No                   Follow-up: 30 day(s)     Shweta Richardson Pharmacy Technician  Specialty Pharmacy Technician

## 2024-12-19 ENCOUNTER — SPECIALTY PHARMACY (OUTPATIENT)
Dept: PHARMACY | Facility: HOSPITAL | Age: 64
End: 2024-12-19
Payer: COMMERCIAL

## 2024-12-19 NOTE — PROGRESS NOTES
Specialty Pharmacy Refill Coordination Note     Mariola is a 64 y.o. female contacted today regarding refills of  Dexcom sensor, Metformin, Mounjaro, Omnipod specialty medication(s).    Reviewed and verified with patient:       Specialty medication(s) and dose(s) confirmed: yes    Refill Questions      Flowsheet Row Most Recent Value   Changes to allergies? No   Changes to medications? No   New conditions or infections since last clinic visit No   Unplanned office visit, urgent care, ED, or hospital admission in the last 4 weeks  No   How does patient/caregiver feel medication is working? Very good   Financial problems or insurance changes  No   Since the previous refill, were any specialty medication doses or scheduled injections missed or delayed?  No   Does this patient require a clinical escalation to a pharmacist? No            Delivery Questions      Flowsheet Row Most Recent Value   Delivery method UPS   Delivery address verified with patient/caregiver? Yes   Delivery address Home   Number of medications in delivery 4   Medication(s) being filled and delivered Continuous Glucose Sensor (Dexcom G6 Sensor), Insulin Disposable Pump (Omnipod 5 [id number] Pods Gen 5), metFORMIN HCl (GLUCOPHAGE), Tirzepatide   Doses left of specialty medications na   Copay verified? Yes   Copay amount 35.00$   Copay form of payment Credit/debit on file   Ship Date 12/19   Delivery Date 12/20   Signature Required No                   Follow-up: 30 day(s)     Shweta Richardson Pharmacy Technician  Specialty Pharmacy Technician

## 2025-01-15 ENCOUNTER — SPECIALTY PHARMACY (OUTPATIENT)
Dept: PHARMACY | Facility: HOSPITAL | Age: 65
End: 2025-01-15
Payer: COMMERCIAL

## 2025-01-15 NOTE — PROGRESS NOTES
Specialty Pharmacy Refill Coordination Note     Mariola is a 64 y.o. female contacted today regarding refills of  Dexcom sensor, metformin, mounjaro, omnipod specialty medication(s).    Reviewed and verified with patient:       Specialty medication(s) and dose(s) confirmed: yes    Refill Questions      Flowsheet Row Most Recent Value   Changes to allergies? No   Changes to medications? Yes  [Started on Gabapentin 400 QHS]   New conditions or infections since last clinic visit No   Unplanned office visit, urgent care, ED, or hospital admission in the last 4 weeks  No   How does patient/caregiver feel medication is working? Very good   Financial problems or insurance changes  No   Since the previous refill, were any specialty medication doses or scheduled injections missed or delayed?  No   Does this patient require a clinical escalation to a pharmacist? Yes            Delivery Questions      Flowsheet Row Most Recent Value   Delivery method UPS   Delivery address verified with patient/caregiver? Yes   Delivery address Home   Number of medications in delivery 4   Medication(s) being filled and delivered Continuous Glucose Sensor (Dexcom G6 Sensor), Insulin Disposable Pump (Omnipod 5 [id number] Pods Gen 5), metFORMIN HCl (GLUCOPHAGE), Tirzepatide   Doses left of specialty medications na   Copay verified? Yes   Copay amount 35.00$   Copay form of payment Credit/debit on file   Ship Date 01/16   Delivery Date 01/17   Signature Required No                   Follow-up: 30 day(s)     Shweta Richardson Pharmacy Technician  Specialty Pharmacy Technician

## 2025-01-16 RX ORDER — GABAPENTIN 400 MG/1
400 CAPSULE ORAL NIGHTLY
COMMUNITY

## 2025-01-16 NOTE — PROGRESS NOTES
Refill for G6 sensors, Omnipod, Metformin, and Mounjaro escalated to pharmacist as pt started gabapentin 400mg po qhs recently.  Medication added to home med list.  No significant interactions between gabapentin and pt's diabetic medications.    Walter Churchill, [facility]  01/16/25  16:21 EST

## 2025-01-28 ENCOUNTER — SPECIALTY PHARMACY (OUTPATIENT)
Dept: PHARMACY | Facility: HOSPITAL | Age: 65
End: 2025-01-28
Payer: COMMERCIAL

## 2025-01-28 NOTE — PROGRESS NOTES
Specialty Pharmacy Refill Coordination Note     Mariola is a 64 y.o. female contacted today regarding refills of  Dexcom transmitter and Humalog specialty medication(s).    Reviewed and verified with patient:       Specialty medication(s) and dose(s) confirmed: yes    Refill Questions      Flowsheet Row Most Recent Value   Changes to allergies? No   Changes to medications? No   New conditions or infections since last clinic visit No   Unplanned office visit, urgent care, ED, or hospital admission in the last 4 weeks  No   How does patient/caregiver feel medication is working? Very good   Financial problems or insurance changes  No   Since the previous refill, were any specialty medication doses or scheduled injections missed or delayed?  No   Does this patient require a clinical escalation to a pharmacist? No            Delivery Questions      Flowsheet Row Most Recent Value   Delivery method UPS   Delivery address verified with patient/caregiver? Yes   Delivery address Home   Number of medications in delivery 2   Medication(s) being filled and delivered Continuous Glucose Transmitter (Dexcom G6 Transmitter), Insulin Lispro (humaLOG)   Doses left of specialty medications na   Copay verified? Yes   Copay amount 0.00$   Copay form of payment No copayment ($0)   Ship Date 01/29   Delivery Date Selection 01/30/25   Signature Required No                   Follow-up: 30 day(s)     Shweta Richardson, Pharmacy Technician  Specialty Pharmacy Technician

## 2025-02-10 ENCOUNTER — SPECIALTY PHARMACY (OUTPATIENT)
Dept: PHARMACY | Facility: HOSPITAL | Age: 65
End: 2025-02-10
Payer: COMMERCIAL

## 2025-02-20 ENCOUNTER — SPECIALTY PHARMACY (OUTPATIENT)
Dept: PHARMACY | Facility: HOSPITAL | Age: 65
End: 2025-02-20
Payer: COMMERCIAL

## 2025-02-20 NOTE — PROGRESS NOTES
Specialty Pharmacy Patient Management Program  Refill Outreach     Mariola was contacted today regarding refills of their medication(s).    Refill Questions      Flowsheet Row Most Recent Value   Changes to allergies? No   Changes to medications? No   New conditions or infections since last clinic visit No   Unplanned office visit, urgent care, ED, or hospital admission in the last 4 weeks  No   How does patient/caregiver feel medication is working? Very good   Financial problems or insurance changes  No   Since the previous refill, were any specialty medication doses or scheduled injections missed or delayed?  No   Does this patient require a clinical escalation to a pharmacist? No            Delivery Questions      Flowsheet Row Most Recent Value   Delivery method UPS   Delivery address verified with patient/caregiver? Yes   Delivery address Home   Number of medications in delivery 1   Medication(s) being filled and delivered Insulin Lispro (humaLOG)   Doses left of specialty medications na   Copay verified? Yes   Copay amount 0.00$   Copay form of payment No copayment ($0)   Delivery Date Selection 02/21/25   Signature Required No                 Follow-up: 30 day(s)     Shweta Richardson, Pharmacy Technician  2/20/2025  13:08 EST

## 2025-02-21 ENCOUNTER — SPECIALTY PHARMACY (OUTPATIENT)
Dept: PHARMACY | Facility: HOSPITAL | Age: 65
End: 2025-02-21
Payer: COMMERCIAL

## 2025-02-21 NOTE — PROGRESS NOTES
Called pt on 2/13/25 and informed her that certain Dexcom G6 receivers aren't appropriately notifying patients if their BG is high or low. The batch that is affected is Part number [id number].  Pt states that she uses both a physical  and her phone as a .  Pt couldn't see the part number on the back of her , however she stated that her  does alert her if she is high or low.    Walter Churchill, [facility]

## 2025-03-07 ENCOUNTER — SPECIALTY PHARMACY (OUTPATIENT)
Dept: PHARMACY | Facility: HOSPITAL | Age: 65
End: 2025-03-07
Payer: COMMERCIAL

## 2025-03-07 ENCOUNTER — PRIOR AUTHORIZATION (OUTPATIENT)
Dept: ENDOCRINOLOGY | Facility: CLINIC | Age: 65
End: 2025-03-07
Payer: COMMERCIAL

## 2025-03-07 NOTE — PROGRESS NOTES
Specialty Pharmacy Patient Management Program  Refill Outreach     Mariola was contacted today regarding refills of their medication(s).    Refill Questions      Flowsheet Row Most Recent Value   Changes to allergies? No   Changes to medications? No   New conditions or infections since last clinic visit No   Unplanned office visit, urgent care, ED, or hospital admission in the last 4 weeks  No   How does patient/caregiver feel medication is working? Very good   Financial problems or insurance changes  No   Since the previous refill, were any specialty medication doses or scheduled injections missed or delayed?  No   Does this patient require a clinical escalation to a pharmacist? No            Delivery Questions      Flowsheet Row Most Recent Value   Delivery method UPS   Delivery address verified with patient/caregiver? Yes   Delivery address Home   Number of medications in delivery 4   Medication(s) being filled and delivered Continuous Glucose Sensor (Dexcom G6 Sensor), Insulin Disposable Pump (Omnipod 5 [id number] Pods Gen 5), Insulin Lispro (humaLOG), metFORMIN HCl (GLUCOPHAGE)   Doses left of specialty medications na   Copay verified? Yes   Copay amount 35.00$   Copay form of payment Credit/debit on file   Delivery Date Selection 03/11/25   Signature Required No                 Follow-up: 30 day(s)     Shweta Richardson, Pharmacy Technician  3/7/2025  14:22 EST

## 2025-03-17 ENCOUNTER — SPECIALTY PHARMACY (OUTPATIENT)
Dept: PHARMACY | Facility: HOSPITAL | Age: 65
End: 2025-03-17
Payer: COMMERCIAL

## 2025-03-17 NOTE — PROGRESS NOTES
Specialty Pharmacy Patient Management Program  Refill Outreach     Mariola was contacted today regarding refills of their medication(s).    Refill Questions      Flowsheet Row Most Recent Value   Changes to allergies? No   Changes to medications? No   New conditions or infections since last clinic visit No   Unplanned office visit, urgent care, ED, or hospital admission in the last 4 weeks  No   How does patient/caregiver feel medication is working? Very good   Financial problems or insurance changes  No   Since the previous refill, were any specialty medication doses or scheduled injections missed or delayed?  No   Does this patient require a clinical escalation to a pharmacist? No            Delivery Questions      Flowsheet Row Most Recent Value   Delivery method UPS   Delivery address verified with patient/caregiver? Yes   Delivery address Home   Number of medications in delivery 1   Medication(s) being filled and delivered Insulin Lispro (humaLOG)   Doses left of specialty medications na   Copay verified? Yes   Copay amount 0.00$   Copay form of payment No copayment ($0)   Delivery Date Selection 03/18/25   Signature Required No                 Follow-up: 30 day(s)     Shweta Richardson, Pharmacy Technician  3/17/2025  15:08 EDT

## 2025-04-03 DIAGNOSIS — E11.65 UNCONTROLLED TYPE 2 DIABETES MELLITUS WITH HYPERGLYCEMIA: ICD-10-CM

## 2025-04-04 ENCOUNTER — SPECIALTY PHARMACY (OUTPATIENT)
Dept: PHARMACY | Facility: HOSPITAL | Age: 65
End: 2025-04-04
Payer: COMMERCIAL

## 2025-04-04 DIAGNOSIS — E11.65 UNCONTROLLED TYPE 2 DIABETES MELLITUS WITH HYPERGLYCEMIA: ICD-10-CM

## 2025-04-04 RX ORDER — PROCHLORPERAZINE 25 MG/1
1 SUPPOSITORY RECTAL
Qty: 1 EACH | Refills: 1 | Status: SHIPPED | OUTPATIENT
Start: 2025-04-04

## 2025-04-04 RX ORDER — INSULIN LISPRO 100 [IU]/ML
INJECTION, SOLUTION INTRAVENOUS; SUBCUTANEOUS
Qty: 60 ML | Refills: 5 | Status: SHIPPED | OUTPATIENT
Start: 2025-04-04

## 2025-04-04 RX ORDER — PROCHLORPERAZINE 25 MG/1
SUPPOSITORY RECTAL
Qty: 3 EACH | Refills: 5 | Status: SHIPPED | OUTPATIENT
Start: 2025-04-04

## 2025-04-04 RX ORDER — TIRZEPATIDE 12.5 MG/.5ML
12.5 INJECTION, SOLUTION SUBCUTANEOUS WEEKLY
Qty: 2 ML | Refills: 5 | Status: SHIPPED | OUTPATIENT
Start: 2025-04-04

## 2025-04-04 RX ORDER — INSULIN PMP CART,AUT,G6/7,CNTR
1 EACH SUBCUTANEOUS EVERY OTHER DAY
Qty: 15 EACH | Refills: 5 | Status: SHIPPED | OUTPATIENT
Start: 2025-04-04

## 2025-04-04 NOTE — PROGRESS NOTES
Specialty Pharmacy Patient Management Program  Endocrinology Reassessment     Mariola Beck is a 64 y.o. female with Type 2 Diabetes seen by an Endocrinology provider and enrolled in the Endocrinology Patient Management program offered by [facility].  A follow-up outreach was conducted, including assessment of continued therapy appropriateness, medication adherence, and side effect incidence and management for Mounjaro, Humalog, Omnipod, and Metformin.     Patient is currently taking Mounjaro 12.5 mg weekly, metformin 1,000 mg BID, and using Humalog in her Omnipod 5 insulin pump. She uses Dexcom G6 to monitor BG with readings between 90s-180s. Patient denies recurring low BG < 70 mg/dL. She denies any issues or any missed doses at this time. She had to reschedule her follow-up appointment and needs refills for diabetes medications today.     Patient denies personal/family history of thyroid cancer and denies issues with pancreas/pancreatitis.    Changes to Insurance Coverage or Financial Support  None    Relevant Past Medical History and Comorbidities  Relevant medical history and concomitant health conditions were discussed with the patient. The patient's chart has been reviewed for relevant past medical history and comorbid health conditions and updated as necessary.   Past Medical History:   Diagnosis Date    Asthma     Chronic headaches     COPD (chronic obstructive pulmonary disease)     H/O bladder infections     Heart attack     Heart disease     Hypertension     Type 2 diabetes mellitus      Social History     Socioeconomic History    Marital status:    Tobacco Use    Smoking status: Former     Current packs/day: 1.00     Types: Cigarettes    Smokeless tobacco: Never   Vaping Use    Vaping status: Never Used   Substance and Sexual Activity    Alcohol use: Not Currently    Drug use: Never    Sexual activity: Defer       Problem list reviewed by Felisa Mendez, PharmD on  "4/4/2025 at 11:19 AM    Allergies  Known allergies and reactions were discussed with the patient. The patient's chart has been reviewed for allergy information and updated as necessary.   Plavix [clopidogrel]    Allergies reviewed by Felisa Mendez, Shannan on 4/4/2025 at 11:19 AM    Relevant Laboratory Values  A1C Last 3 Results          10/31/2024    15:45   HGBA1C Last 3 Results   Hemoglobin A1C 6.6      Lab Results   Component Value Date    HGBA1C 6.6 (A) 10/31/2024     No results found for: \"GLUCOSE\", \"CALCIUM\", \"NA\", \"K\", \"CO2\", \"CL\", \"BUN\", \"CREATININE\", \"EGFRIFAFRI\", \"EGFRIFNONA\", \"BCR\", \"ANIONGAP\"  No results found for: \"CHOL\", \"CHLPL\", \"TRIG\", \"HDL\", \"LDL\", \"LDLDIRECT\"      Current Medication List  This medication list has been reviewed with the patient and evaluated for any interactions or necessary modifications/recommendations, and updated to include all prescription medications, OTC medications, and supplements the patient is currently taking.  This list reflects what is contained in the patient's profile, which has also been marked as reviewed to communicate to other providers it is the most up to date version of the patient's current medication therapy.     Current Outpatient Medications:     acetaminophen (Tylenol) 325 MG tablet, Take 2 tablets by mouth Every 6 (Six) Hours As Needed for Mild Pain., Disp: , Rfl:     aspirin 81 MG EC tablet, Take 1 tablet by mouth Daily., Disp: , Rfl:     Berberine Chloride (BERBERINE HCI PO), Take 2 tablets by mouth Daily., Disp: , Rfl:     Calcium Carbonate (CALCIUM 600 PO), Take 600 mg by mouth Daily., Disp: , Rfl:     coenzyme Q10 100 MG capsule, Take 1 capsule by mouth Daily., Disp: , Rfl:     Continuous Blood Gluc  (Dexcom G6 ) device, 1 each Continuous., Disp: 1 each, Rfl: 0    Continuous Glucose Transmitter (Dexcom G6 Transmitter) misc, Change transmitter Every 3 (Three) Months., Disp: 1 each, Rfl: 1    Cyanocobalamin (B-12) 1000 MCG sublingual " "tablet, Take 1 tablet by mouth Daily., Disp: , Rfl:     Ferrous Sulfate (IRON PO), Take 1 tablet by mouth Daily., Disp: , Rfl:     gabapentin (NEURONTIN) 400 MG capsule, Take 1 capsule by mouth Every Night., Disp: , Rfl:     Insulin Lispro (HumaLOG) 100 UNIT/ML injection, For use in insulin pump.  Maximum Daily Dose: 200 units, Disp: 60 mL, Rfl: 5    Insulin Pen Needle (Pen Needles) 32G X 4 MM misc, Use to inject insulin up to 4 times daily as directed, Disp: 100 each, Rfl: 5    Insulin Syringe 31G X 5/16\" 1 ML misc, To administer insulin BID., Disp: 100 each, Rfl: 2    Magnesium 300 MG capsule, Take 300 mg by mouth 2 (Two) Times a Day., Disp: , Rfl:     metoprolol tartrate (LOPRESSOR) 25 MG tablet, Take 1 tablet by mouth 2 (Two) Times a Day., Disp: , Rfl:     montelukast (SINGULAIR) 10 MG tablet, Take 1 tablet by mouth Daily., Disp: , Rfl:     Multiple Vitamins-Minerals (ICAPS AREDS 2 PO), Take 2 capsules by mouth 2 (Two) Times a Day., Disp: , Rfl:     nitroglycerin (NITROSTAT) 0.4 MG SL tablet, DISSOLVE 1 TABLET UNDER THE TONGUE AS NEEDED, Disp: , Rfl:     Omega-3 Fatty Acids (Odorless Coated Fish Oil) 1000 MG capsule delayed-release, 2 (two) times a day., Disp: , Rfl:     pantoprazole (PROTONIX) 40 MG EC tablet, Take 1 tablet by mouth Daily., Disp: , Rfl:     prasugrel (EFFIENT) 10 MG tablet, Take 1 tablet by mouth Daily., Disp: , Rfl:     rOPINIRole (REQUIP) 0.25 MG tablet, Take 1 tablet by mouth Daily., Disp: , Rfl:     rosuvastatin (CRESTOR) 20 MG tablet, Take 1 tablet by mouth Every Evening., Disp: , Rfl:     vitamin D3 125 MCG (5000 UT) capsule capsule, Take 1 capsule by mouth Daily., Disp: , Rfl:     vitamin E 100 UNIT capsule, Take 1 capsule by mouth Daily., Disp: , Rfl:     Continuous Glucose Sensor (Dexcom G6 Sensor), Change sensor every 10 (Ten) Days., Disp: 3 each, Rfl: 5    insulin degludec (Tresiba FlexTouch) 100 UNIT/ML solution pen-injector injection, Inject 60 Units under the skin into the " appropriate area as directed Daily. Only use in case of pump failure (Patient not taking: Reported on 9/12/2024), Disp: 15 mL, Rfl: 0    Insulin Disposable Pump (Omnipod 5 [id number] Pods Gen 5) misc, Use 1 pod Every Other Day., Disp: 15 each, Rfl: 5    metFORMIN (GLUCOPHAGE) 1000 MG tablet, Take 1 tablet by mouth 2 (Two) Times a Day., Disp: 60 tablet, Rfl: 5    Tirzepatide (Mounjaro) 12.5 MG/0.5ML solution auto-injector, Inject 12.5 mg under the skin into the appropriate area as directed 1 (One) Time Per Week., Disp: 2 mL, Rfl: 5    Medicines reviewed by Felisa Mendez, PharmD on 4/4/2025 at 11:24 AM    Drug Interactions  The hypoglycemic effect of Insulin may be increased or prolonged by metoprolol. Minor cardiovascular abnormalities may occur during hypoglycemic episodes.    Recommended Medications Assessment  Aspirin - Currently Taking  Statin - Currently Taking  ACEi/ARB - Not Indicated    Adverse Drug Reactions  Adverse Reactions Experienced: None   Plan for ADR Management: Not Required    Hospitalizations and Urgent Care Since Last Assessment  Hospitalizations or Admissions: None  ED Visits: None   Urgent Office Visits: None    Adherence and Self-Administration  Adherence related patient's specialty therapy was discussed with the patient. The Adherence segment of this outreach has been reviewed and updated.     Ongoing or New Barriers to Patient Adherence and/or Self-Administration: None  Methods for Supporting Patient Adherence and/or Self-Administration: None Required    Goals of Therapy  Goals related to the patient's specialty therapy was discussed with the patient. The Patient Goals segment of this outreach has been reviewed and updated.    Goals Addressed Today        Consistently take medications as prescribed      HEMOGLOBIN A1C < 7      Patient is on track because A1c is <7%       Specialty Pharmacy General Goal      Minimize hypoglycemia            Quality of Life Assessment   Quality of Life related to  the patient's specialty therapy was discussed with the patient. The QOL segment of this outreach has been reviewed and updated.    Quality of Life Assessment  Quality of Life Improvement Scale: 8-Moderately better    Reassessment Plan & Follow-Up  Patient's diabetes remains at goal with most recent A1c of 6.6%  Medication Therapy Changes: Per Sheryl Yip, APRN will order refills for   Metformin 1,000 mg BID   Humalog for use in insulin pump  Dexcom sensors and omnipods  Mounjaro 12.5mg weekly  Additional Plans, Therapy Recommendations, or Therapy Problems to Be Addressed:  Will send updated RX's to [facility] for mail-out.  Pharmacist to perform regular reassessments no more than (6) months from the previous assessment.  Care Coordinator to set up future refill outreaches, coordinate prescription delivery, and escalate clinical questions to pharmacist.      Attestation  I attest the patient was actively involved in and has agreed to the above plan of care. If the prescribed therapy is at any point deemed not appropriate based on the current or future assessments, a consultation will be initiated with the patient's specialty care provider to determine the best course of action. The revised plan of therapy will be documented along with any required assessments and/or additional patient education provided.     Felisa Mendez, Shannan, MCKAYLA HOLMAN  Clinical Specialty Pharmacist, Endocrinology   04/04/25  11:54 EDT

## 2025-04-15 ENCOUNTER — SPECIALTY PHARMACY (OUTPATIENT)
Dept: PHARMACY | Facility: HOSPITAL | Age: 65
End: 2025-04-15
Payer: COMMERCIAL

## 2025-04-15 NOTE — PROGRESS NOTES
Specialty Pharmacy Patient Management Program  Refill Outreach     Mariola was contacted today regarding refills of their medication(s). Dexcom G6 sensors, G6 transmitter, Humalog, metformin, Mounjaro, & Omnipod 5.    Refill Questions      Flowsheet Row Most Recent Value   Changes to allergies? No   New conditions or infections since last clinic visit No   Unplanned office visit, urgent care, ED, or hospital admission in the last 4 weeks  No   How does patient/caregiver feel medication is working? Very good   Financial problems or insurance changes  No   Since the previous refill, were any specialty medication doses or scheduled injections missed or delayed?  No   Does this patient require a clinical escalation to a pharmacist? No            Delivery Questions      Flowsheet Row Most Recent Value   Delivery method UPS   Delivery address verified with patient/caregiver? Yes   Delivery address Home   Number of medications in delivery 6   Medication(s) being filled and delivered Continuous Glucose Sensor (Dexcom G6 Sensor), Insulin Lispro (humaLOG), metFORMIN HCl (GLUCOPHAGE), Tirzepatide (Mounjaro), Insulin Disposable Pump (Omnipod 5 [id number] Pods Gen 5), Continuous Glucose Transmitter (Dexcom G6 Transmitter)   Doses left of specialty medications 1 week   Copay verified? Yes   Copay amount $35.00   Copay form of payment Credit/debit on file   Delivery Date Selection 04/16/25   Signature Required No   Do you consent to receive electronic handouts?  Yes                 Follow-up: 30 day(s)     Nae Lua, Pharmacy Technician  4/15/2025  09:51 EDT

## 2025-05-15 ENCOUNTER — SPECIALTY PHARMACY (OUTPATIENT)
Dept: PHARMACY | Facility: HOSPITAL | Age: 65
End: 2025-05-15
Payer: COMMERCIAL

## 2025-05-15 NOTE — PROGRESS NOTES
Specialty Pharmacy Patient Management Program  Refill Outreach     Mariola was contacted today regarding refills of their medication(s).    Refill Questions      Flowsheet Row Most Recent Value   Changes to allergies? No   Changes to medications? No   New conditions or infections since last clinic visit No   Unplanned office visit, urgent care, ED, or hospital admission in the last 4 weeks  No   How does patient/caregiver feel medication is working? Very good   Financial problems or insurance changes  No   Since the previous refill, were any specialty medication doses or scheduled injections missed or delayed?  No   Does this patient require a clinical escalation to a pharmacist? No            Delivery Questions      Flowsheet Row Most Recent Value   Delivery method UPS   Delivery address verified with patient/caregiver? Yes   Delivery address Home   Number of medications in delivery 4   Medication(s) being filled and delivered Continuous Glucose Sensor (Dexcom G6 Sensor), Insulin Disposable Pump (Omnipod 5 [id number] Pods Gen 5), Tirzepatide (Mounjaro), metFORMIN HCl (GLUCOPHAGE)   Doses left of specialty medications na   Copay verified? Yes   Copay amount 35.00$   Copay form of payment Credit/debit on file   Delivery Date Selection 05/19/25   Signature Required No   Do you consent to receive electronic handouts?  Yes                 Follow-up: 30 day(s)     Shweta Richardson, Pharmacy Technician  5/15/2025  15:21 EDT

## 2025-06-12 ENCOUNTER — SPECIALTY PHARMACY (OUTPATIENT)
Dept: PHARMACY | Facility: HOSPITAL | Age: 65
End: 2025-06-12
Payer: COMMERCIAL

## 2025-06-12 ENCOUNTER — OFFICE VISIT (OUTPATIENT)
Dept: ENDOCRINOLOGY | Facility: CLINIC | Age: 65
End: 2025-06-12
Payer: MEDICARE

## 2025-06-12 VITALS
DIASTOLIC BLOOD PRESSURE: 74 MMHG | BODY MASS INDEX: 34.9 KG/M2 | WEIGHT: 197 LBS | HEART RATE: 81 BPM | SYSTOLIC BLOOD PRESSURE: 108 MMHG | OXYGEN SATURATION: 96 %

## 2025-06-12 DIAGNOSIS — E11.65 UNCONTROLLED TYPE 2 DIABETES MELLITUS WITH HYPERGLYCEMIA: ICD-10-CM

## 2025-06-12 DIAGNOSIS — E11.65 UNCONTROLLED TYPE 2 DIABETES MELLITUS WITH HYPERGLYCEMIA: Primary | ICD-10-CM

## 2025-06-12 LAB
EXPIRATION DATE: ABNORMAL
HBA1C MFR BLD: 6.4 % (ref 4.5–5.7)
Lab: ABNORMAL

## 2025-06-12 RX ORDER — INSULIN LISPRO 100 [IU]/ML
INJECTION, SOLUTION INTRAVENOUS; SUBCUTANEOUS
Qty: 60 ML | Refills: 5 | Status: SHIPPED | OUTPATIENT
Start: 2025-06-12

## 2025-06-12 RX ORDER — INSULIN PMP CART,AUT,G6/7,CNTR
1 EACH SUBCUTANEOUS EVERY OTHER DAY
Qty: 15 EACH | Refills: 5 | Status: SHIPPED | OUTPATIENT
Start: 2025-06-12

## 2025-06-12 RX ORDER — PROCHLORPERAZINE 25 MG/1
SUPPOSITORY RECTAL
Qty: 3 EACH | Refills: 5 | Status: SHIPPED | OUTPATIENT
Start: 2025-06-12

## 2025-06-12 RX ORDER — PROCHLORPERAZINE 25 MG/1
1 SUPPOSITORY RECTAL
Qty: 1 EACH | Refills: 1 | Status: SHIPPED | OUTPATIENT
Start: 2025-06-12

## 2025-06-12 RX ORDER — TIRZEPATIDE 12.5 MG/.5ML
12.5 INJECTION, SOLUTION SUBCUTANEOUS WEEKLY
Qty: 2 ML | Refills: 5 | Status: SHIPPED | OUTPATIENT
Start: 2025-06-12

## 2025-06-12 NOTE — PROGRESS NOTES
Chief Complaint   Patient presents with    Follow-up        Mariola Beck is a 65 y.o. female had concerns including Follow-up.    T2DM.    She reports that she is doing well.  She denies any changes to her health.  She is taking her medication regularly without missing doses.     Diabetes:  She is checking blood sugars frequently with Dexcom CGM. She has not been using this the last couple of days, due to not reading appropriately.  Current medications for diabetes include Mounjaro 12.5 mg weekly, insulin via an Omnipod 5 insulin pump, Metformin 1,000 mg BID.   She is on statin therapy.  Hypos: rarely    Is getting injections in the left eye for diabetic retinopathy.  She has a history of UTI and yeast infection.  She has not been following a diet/exercise program.  She is doing well since starting on her insulin pump.    The following portions of the patient's history were reviewed and updated as appropriate: allergies, current medications, past family history, past medical history, past social history, past surgical history and problem list.      Review of Systems   Constitutional:  Positive for fatigue.   Respiratory:  Positive for shortness of breath.    Endocrine: Positive for polydipsia. Negative for polyuria.   Skin:         Wound to right foot second digit.   All other systems reviewed and are negative.   Symptoms remain present and unchanged from prior.     Physical Exam  Vitals reviewed.   Constitutional:       Appearance: Normal appearance.   Cardiovascular:      Rate and Rhythm: Normal rate.   Pulmonary:      Effort: Pulmonary effort is normal.   Skin:     General: Skin is warm.   Neurological:      General: No focal deficit present.      Mental Status: She is alert and oriented to person, place, and time.   Psychiatric:         Mood and Affect: Mood normal.         Behavior: Behavior normal.         Thought Content: Thought content normal.         Judgment: Judgment normal.        /74 (BP  "Location: Right arm, Patient Position: Sitting, Cuff Size: Adult)   Pulse 81   Wt 89.4 kg (197 lb)   LMP  (LMP Unknown)   SpO2 96%   BMI 34.90 kg/m²      Labs and imaging    CMP:  Lab Results   Component Value Date    Glucose 107 12/21/2023    Creatinine, Urine 179.1 12/21/2023     Lipid Panel:  No results found for: \"CHOL\", \"TRIG\", \"HDL\", \"VLDL\", \"LDL\"  HbA1c:  Lab Results   Component Value Date    HGBA1C 6.4 (A) 06/12/2025   6.6 (01/2024)  7.1 (06/01/2024)    Glucose:  Lab Results   Component Value Date    POCGLU 107 12/21/2023     Microalbumin:  Lab Results   Component Value Date    MALBCRERATIO 8.9 12/21/2023     TSH:  No results found for: \"TSH\"    Assessment and plan  Diagnoses and all orders for this visit:    1. Uncontrolled type 2 diabetes mellitus with hyperglycemia (Primary)  Assessment & Plan:  -Diabetes is stable with A1c 6.4.  -Continue Mounjaro 12.5 mg weekly. Patient has no personal history of pancreatitis, no family history of MEN syndrome or medullary thyroid cancer. Possible side effects including nausea, bloating, other GI upset and rarely pancreatitis were discussed. She was advised to call the office with any symptoms or concerns.   -Discussed continued use of BG's regularly.  Continue using CGM.  2 week data download is as follows:  Very high: 2%  High: 11%  In range: 85%  Low: 2%  Very low: 0%  Trend shows overall regulated BGs with some mild post supper hyperglycemia.  -Continue with current insulin pump settings.  -Continue Metformin 1,000 mg BID.  -S/S hypoglycemia discussed with Rule of 15's advised.  -Will reassess in 3 months.    Orders:  -     POC Glycosylated Hemoglobin (Hb A1C)  -     Microalbumin / Creatinine Urine Ratio - Urine, Clean Catch             Return in about 3 months (around 9/12/2025) for Follow-up appointment, A1C. The patient was instructed to contact the clinic with any interval questions or concerns.    This document has been electronically signed by Sheryl" KHURRAM Yip  June 12, 2025 15:43 EDT  Endocrinology    Please note that portions of this document were completed with a voice recognition program. Efforts were made to edit the dictations, but occasionally words are mis-transcribed.

## 2025-06-12 NOTE — PROGRESS NOTES
Specialty Pharmacy Patient Management Program  Endocrinology Reassessment     Mariola Beck is a 65 y.o. female with Type 2 Diabetes seen by an Endocrinology provider and enrolled in the Endocrinology Patient Management program offered by [facility].  A follow-up outreach was conducted, including assessment of continued therapy appropriateness, medication adherence, and side effect incidence and management for Mounjaro, Humalog, Omnipod, and Metformin.     Patient is currently taking Mounjaro 12.5 mg weekly, metformin 1,000 mg BID, and using Humalog in her Omnipod 5 insulin pump. She uses Dexcom G6 to monitor BG with readings between 90s-150s. Patient denies recurring low BG < 70 mg/dL. She denies any issues or any missed doses at this time.    Patient denies personal/family history of thyroid cancer and denies issues with pancreas/pancreatitis.    Changes to Insurance Coverage or Financial Support  None    Relevant Past Medical History and Comorbidities  Relevant medical history and concomitant health conditions were discussed with the patient. The patient's chart has been reviewed for relevant past medical history and comorbid health conditions and updated as necessary.   Past Medical History:   Diagnosis Date    Asthma     Chronic headaches     COPD (chronic obstructive pulmonary disease)     H/O bladder infections     Heart attack     Heart disease     Hypertension     Type 2 diabetes mellitus      Social History     Socioeconomic History    Marital status:    Tobacco Use    Smoking status: Former     Current packs/day: 1.00     Types: Cigarettes    Smokeless tobacco: Never   Vaping Use    Vaping status: Never Used   Substance and Sexual Activity    Alcohol use: Not Currently    Drug use: Never    Sexual activity: Defer       Problem list reviewed by Felisa Mendez, PharmD on 6/12/2025 at  1:43 PM    Allergies  Known allergies and reactions were discussed with the patient. The  "patient's chart has been reviewed for allergy information and updated as necessary.   Plavix [clopidogrel]    Allergies reviewed by Felisa Mendez, Shannan on 6/12/2025 at  1:42 PM    Relevant Laboratory Values  A1C Last 3 Results          10/31/2024    15:45 6/12/2025    13:38   HGBA1C Last 3 Results   Hemoglobin A1C 6.6  6.4      Lab Results   Component Value Date    HGBA1C 6.4 (A) 06/12/2025     No results found for: \"GLUCOSE\", \"CALCIUM\", \"NA\", \"K\", \"CO2\", \"CL\", \"BUN\", \"CREATININE\", \"EGFRIFAFRI\", \"EGFRIFNONA\", \"BCR\", \"ANIONGAP\"  No results found for: \"CHOL\", \"CHLPL\", \"TRIG\", \"HDL\", \"LDL\", \"LDLDIRECT\"      Current Medication List  This medication list has been reviewed with the patient and evaluated for any interactions or necessary modifications/recommendations, and updated to include all prescription medications, OTC medications, and supplements the patient is currently taking.  This list reflects what is contained in the patient's profile, which has also been marked as reviewed to communicate to other providers it is the most up to date version of the patient's current medication therapy.     Current Outpatient Medications:     acetaminophen (Tylenol) 325 MG tablet, Take 2 tablets by mouth Every 6 (Six) Hours As Needed for Mild Pain., Disp: , Rfl:     aspirin 81 MG EC tablet, Take 1 tablet by mouth Daily., Disp: , Rfl:     Berberine Chloride (BERBERINE HCI PO), Take 2 tablets by mouth Daily., Disp: , Rfl:     Calcium Carbonate (CALCIUM 600 PO), Take 600 mg by mouth Daily., Disp: , Rfl:     coenzyme Q10 100 MG capsule, Take 1 capsule by mouth Daily., Disp: , Rfl:     Continuous Blood Gluc  (Dexcom G6 ) device, 1 each Continuous., Disp: 1 each, Rfl: 0    Continuous Glucose Sensor (Dexcom G6 Sensor), Change sensor every 10 (Ten) Days., Disp: 3 each, Rfl: 5    Continuous Glucose Transmitter (Dexcom G6 Transmitter) misc, Change transmitter Every 3 (Three) Months., Disp: 1 each, Rfl: 1    Cyanocobalamin " "(B-12) 1000 MCG sublingual tablet, Take 1 tablet by mouth Daily., Disp: , Rfl:     Ferrous Sulfate (IRON PO), Take 1 tablet by mouth Daily., Disp: , Rfl:     gabapentin (NEURONTIN) 400 MG capsule, Take 1 capsule by mouth Every Night., Disp: , Rfl:     Insulin Disposable Pump (Omnipod 5 [id number] Pods Gen 5) misc, Use 1 pod Every Other Day., Disp: 15 each, Rfl: 5    Insulin Lispro (HumaLOG) 100 UNIT/ML injection, For use in insulin pump.  Maximum Daily Dose: 200 units, Disp: 60 mL, Rfl: 5    Insulin Pen Needle (Pen Needles) 32G X 4 MM misc, Use to inject insulin up to 4 times daily as directed, Disp: 100 each, Rfl: 5    Insulin Syringe 31G X 5/16\" 1 ML misc, To administer insulin BID., Disp: 100 each, Rfl: 2    Magnesium 300 MG capsule, Take 300 mg by mouth 2 (Two) Times a Day., Disp: , Rfl:     metFORMIN (GLUCOPHAGE) 1000 MG tablet, Take 1 tablet by mouth 2 (Two) Times a Day., Disp: 60 tablet, Rfl: 5    metoprolol tartrate (LOPRESSOR) 25 MG tablet, Take 1 tablet by mouth 2 (Two) Times a Day. (Patient taking differently: Take 0.5 tablets by mouth 2 (Two) Times a Day.), Disp: , Rfl:     montelukast (SINGULAIR) 10 MG tablet, Take 1 tablet by mouth Daily., Disp: , Rfl:     Multiple Vitamins-Minerals (ICAPS AREDS 2 PO), Take 2 capsules by mouth 2 (Two) Times a Day., Disp: , Rfl:     nitroglycerin (NITROSTAT) 0.4 MG SL tablet, DISSOLVE 1 TABLET UNDER THE TONGUE AS NEEDED, Disp: , Rfl:     Omega-3 Fatty Acids (Odorless Coated Fish Oil) 1000 MG capsule delayed-release, 2 (two) times a day., Disp: , Rfl:     pantoprazole (PROTONIX) 40 MG EC tablet, Take 1 tablet by mouth Daily., Disp: , Rfl:     prasugrel (EFFIENT) 10 MG tablet, Take 1 tablet by mouth Daily., Disp: , Rfl:     rosuvastatin (CRESTOR) 20 MG tablet, Take 1 tablet by mouth Every Evening., Disp: , Rfl:     Tirzepatide (Mounjaro) 12.5 MG/0.5ML solution auto-injector, Inject 12.5 mg under the skin into the appropriate area as directed 1 (One) Time Per Week., Disp: 2 " mL, Rfl: 5    vitamin D3 125 MCG (5000 UT) capsule capsule, Take 1 capsule by mouth Daily., Disp: , Rfl:     vitamin E 100 UNIT capsule, Take 1 capsule by mouth Daily., Disp: , Rfl:     insulin degludec (Tresiba FlexTouch) 100 UNIT/ML solution pen-injector injection, Inject 60 Units under the skin into the appropriate area as directed Daily. Only use in case of pump failure (Patient not taking: Reported on 6/12/2025), Disp: 15 mL, Rfl: 0    rOPINIRole (REQUIP) 0.25 MG tablet, Take 1 tablet by mouth Daily., Disp: , Rfl:     Medicines reviewed by Felisa Mendez, PharmD on 6/12/2025 at  1:47 PM    Drug Interactions  The hypoglycemic effect of Insulin may be increased or prolonged by metoprolol. Minor cardiovascular abnormalities may occur during hypoglycemic episodes.    Recommended Medications Assessment  Aspirin - Currently Taking  Statin - Currently Taking  ACEi/ARB - Not Indicated    Adverse Drug Reactions  Adverse Reactions Experienced: None   Plan for ADR Management: Not Required    Hospitalizations and Urgent Care Since Last Assessment  Hospitalizations or Admissions: None  ED Visits: None   Urgent Office Visits: None    Adherence and Self-Administration  Adherence related patient's specialty therapy was discussed with the patient. The Adherence segment of this outreach has been reviewed and updated.     Ongoing or New Barriers to Patient Adherence and/or Self-Administration: None  Methods for Supporting Patient Adherence and/or Self-Administration: None Required    Goals of Therapy  Goals related to the patient's specialty therapy was discussed with the patient. The Patient Goals segment of this outreach has been reviewed and updated.    Goals Addressed Today        Consistently take medications as prescribed      HEMOGLOBIN A1C < 7      Patient is on track because A1c is <7%        Specialty Pharmacy General Goal      Minimize hypoglycemia            Quality of Life Assessment   Quality of Life related to the  patient's specialty therapy was discussed with the patient. The QOL segment of this outreach has been reviewed and updated.    Quality of Life Assessment  Quality of Life Improvement Scale: 8-Moderately better    Reassessment Plan & Follow-Up  Patient's diabetes remains at goal with most recent A1c of 6.4%.  Medication Therapy Changes: Per verbal order from KHURRAM Maldonado no changes today. Will order refills for   Metformin 1,000 mg BID   Humalog for use in insulin pump  Dexcom sensors and omnipods  Mounjaro 12.5mg weekly  Glucagon emergency kit - PRN severe hypoglycemia   Additional Plans, Therapy Recommendations, or Therapy Problems to Be Addressed:  Will send updated RX's to [facility] for mail-out.  Pharmacist to perform regular reassessments no more than (6) months from the previous assessment.  Care Coordinator to set up future refill outreaches, coordinate prescription delivery, and escalate clinical questions to pharmacist.      Attestation  I attest the patient was actively involved in and has agreed to the above plan of care. If the prescribed therapy is at any point deemed not appropriate based on the current or future assessments, a consultation will be initiated with the patient's specialty care provider to determine the best course of action. The revised plan of therapy will be documented along with any required assessments and/or additional patient education provided.     Felisa Mendez, PharmD, MCKAYLA HOLMAN  Clinical Specialty Pharmacist, Endocrinology   06/12/25  16:01 EDT

## 2025-06-12 NOTE — ASSESSMENT & PLAN NOTE
-Diabetes is stable with A1c 6.4.  -Continue Mounjaro 12.5 mg weekly. Patient has no personal history of pancreatitis, no family history of MEN syndrome or medullary thyroid cancer. Possible side effects including nausea, bloating, other GI upset and rarely pancreatitis were discussed. She was advised to call the office with any symptoms or concerns.   -Discussed continued use of BG's regularly.  Continue using CGM.  2 week data download is as follows:  Very high: 2%  High: 11%  In range: 85%  Low: 2%  Very low: 0%  Trend shows overall regulated BGs with some mild post supper hyperglycemia.  -Continue with current insulin pump settings.  -Continue Metformin 1,000 mg BID.  -S/S hypoglycemia discussed with Rule of 15's advised.  -Will reassess in 3 months.

## 2025-06-30 ENCOUNTER — SPECIALTY PHARMACY (OUTPATIENT)
Dept: PHARMACY | Facility: HOSPITAL | Age: 65
End: 2025-06-30
Payer: COMMERCIAL

## 2025-06-30 NOTE — PROGRESS NOTES
Specialty Pharmacy Patient Management Program  Refill Outreach     Mariola was contacted today regarding refills of their medication(s).    Refill Questions      Flowsheet Row Most Recent Value   Changes to allergies? No   Changes to medications? No   New conditions or infections since last clinic visit No   Unplanned office visit, urgent care, ED, or hospital admission in the last 4 weeks  No   How does patient/caregiver feel medication is working? Very good   Financial problems or insurance changes  No   Since the previous refill, were any specialty medication doses or scheduled injections missed or delayed?  No   Does this patient require a clinical escalation to a pharmacist? No            Delivery Questions      Flowsheet Row Most Recent Value   Delivery method UPS   Delivery address verified with patient/caregiver? Yes   Delivery address Home   Number of medications in delivery 6   Medication(s) being filled and delivered Continuous Glucose Sensor (Dexcom G6 Sensor), Insulin Disposable Pump (Omnipod 5 [id number] Pods Gen 5), metFORMIN HCl (GLUCOPHAGE), Tirzepatide (Mounjaro), Insulin Lispro (humaLOG), Continuous Glucose Transmitter (Dexcom G6 Transmitter)   Doses left of specialty medications na   Copay verified? Yes   Copay amount 35.00$   Copay form of payment Credit/debit on file   Delivery Date Selection 07/02/25   Signature Required No   Do you consent to receive electronic handouts?  Yes                 Follow-up: 30 day(s)     Shweta Richardson Pharmacy Technician  6/30/2025  16:14 EDT

## 2025-07-24 ENCOUNTER — SPECIALTY PHARMACY (OUTPATIENT)
Dept: ENDOCRINOLOGY | Facility: CLINIC | Age: 65
End: 2025-07-24
Payer: COMMERCIAL

## 2025-07-24 NOTE — PROGRESS NOTES
Specialty Pharmacy Patient Management Program  Refill Outreach     Mariola was contacted today regarding refills of their medication(s).    Refill Questions      Flowsheet Row Most Recent Value   Changes to allergies? No   Changes to medications? No   New conditions or infections since last clinic visit No   Unplanned office visit, urgent care, ED, or hospital admission in the last 4 weeks  No   How does patient/caregiver feel medication is working? Very good   Financial problems or insurance changes  No   Since the previous refill, were any specialty medication doses or scheduled injections missed or delayed?  No   Does this patient require a clinical escalation to a pharmacist? No            Delivery Questions      Flowsheet Row Most Recent Value   Delivery method UPS   Delivery address verified with patient/caregiver? Yes   Delivery address Home   Number of medications in delivery 5   Medication(s) being filled and delivered Continuous Glucose Sensor (Dexcom G6 Sensor), Insulin Disposable Pump (Omnipod 5 [id number] Pods Gen 5), Insulin Lispro (humaLOG), metFORMIN HCl (GLUCOPHAGE), Tirzepatide (Mounjaro)   Doses left of specialty medications na   Copay verified? Yes   Copay amount $35.00   Copay form of payment Credit/debit on file   Delivery Date Selection 07/25/25   Signature Required No                 Follow-up: 30  day(s)     Nae Lua, Pharmacy Technician  7/24/2025  11:28 EDT